# Patient Record
Sex: FEMALE | Race: WHITE | Employment: OTHER | URBAN - METROPOLITAN AREA
[De-identification: names, ages, dates, MRNs, and addresses within clinical notes are randomized per-mention and may not be internally consistent; named-entity substitution may affect disease eponyms.]

---

## 2017-03-07 ENCOUNTER — HOSPITAL ENCOUNTER (OUTPATIENT)
Dept: MAMMOGRAPHY | Age: 82
Discharge: HOME | End: 2017-03-07
Attending: INTERNAL MEDICINE | Admitting: INTERNAL MEDICINE

## 2017-03-07 DIAGNOSIS — Z12.31 VISIT FOR SCREENING MAMMOGRAM: ICD-10-CM

## 2018-03-20 ENCOUNTER — HOSPITAL ENCOUNTER (OUTPATIENT)
Dept: MAMMOGRAPHY | Age: 83
Discharge: HOME | End: 2018-03-20
Attending: INTERNAL MEDICINE | Admitting: INTERNAL MEDICINE

## 2018-03-20 DIAGNOSIS — Z12.31 VISIT FOR SCREENING MAMMOGRAM: ICD-10-CM

## 2019-03-14 ENCOUNTER — HOSPITAL ENCOUNTER (OUTPATIENT)
Dept: MAMMOGRAPHY | Age: 84
Discharge: HOME | End: 2019-03-14
Payer: MEDICARE

## 2019-03-14 DIAGNOSIS — Z12.31 VISIT FOR SCREENING MAMMOGRAM: ICD-10-CM

## 2019-03-14 PROCEDURE — 77063 BREAST TOMOSYNTHESIS BI: CPT

## 2020-06-03 ENCOUNTER — HOSPITAL ENCOUNTER (OUTPATIENT)
Dept: MAMMOGRAPHY | Age: 85
Discharge: HOME | End: 2020-06-03
Payer: MEDICARE

## 2020-06-03 PROCEDURE — 77067 SCR MAMMO BI INCL CAD: CPT

## 2023-07-26 NOTE — PROGRESS NOTES
RM:________    Referral Provider: No ref. provider found No primary care provider on file.    NEW PATIENT/ CONSULT  PREVIOUS CARDIOLOGIST:   CARDIAC TESTING:     : 1935   AGE: 88 y.o.    2023  REASON FOR VISIT/  CC:      WT: ____________ BP: __________L __________R/ HR_______________    CHEST PAIN: _____________    SOA: ____________PALPS: __________      LIGHTHEADED: ___________ FATIGUE: _______________ EDEMA______________  ALLERGIES:  Patient has no allergy information on record.  SMOKING HISTORY          CHILDREN: _______________________       CAFFEINE USE________  ALCOHOL _____________  OCCUPATION_____________  No past surgical history on file.             FAMILY HISTORY  HEART DISEASE  CHF  DIABETES  CARDIAC ARREST  STROKE  CANCER  ANEURYSM                                                             H/O: MI_____   STROKE________   GOUT_____   ANEMIA______     CAROTID________ HIV____ CAD_______ HYPERCHOL _____    H/O: CHF _____   RF____ DM___ HTN_______PVD____THYROID DISEASE_______    PMH: GI ____   HEPATITIS ___ KIDNEY DISEASE ___ LUNG DISEASE _______     SLEEP APNEA ____ BLOOD CLOTS ____ DVT ____ VEIN STRIPPING ___________     CANCER _________________________________ CHEMO/ RADIATION__________

## 2023-07-27 ENCOUNTER — OFFICE VISIT (OUTPATIENT)
Dept: CARDIOLOGY | Facility: CLINIC | Age: 88
End: 2023-07-27
Payer: MEDICARE

## 2023-07-27 VITALS
HEART RATE: 64 BPM | HEIGHT: 60 IN | SYSTOLIC BLOOD PRESSURE: 103 MMHG | WEIGHT: 134.8 LBS | BODY MASS INDEX: 26.46 KG/M2 | DIASTOLIC BLOOD PRESSURE: 70 MMHG

## 2023-07-27 DIAGNOSIS — I10 PRIMARY HYPERTENSION: ICD-10-CM

## 2023-07-27 DIAGNOSIS — E87.1 CHRONIC HYPONATREMIA: ICD-10-CM

## 2023-07-27 DIAGNOSIS — I49.5 TACHYCARDIA-BRADYCARDIA SYNDROME: ICD-10-CM

## 2023-07-27 DIAGNOSIS — E78.2 MIXED HYPERLIPIDEMIA: ICD-10-CM

## 2023-07-27 DIAGNOSIS — Z95.0 S/P PLACEMENT OF CARDIAC PACEMAKER: ICD-10-CM

## 2023-07-27 DIAGNOSIS — I48.0 PAROXYSMAL ATRIAL FIBRILLATION: ICD-10-CM

## 2023-07-27 DIAGNOSIS — I83.813 VARICOSE VEINS OF BOTH LOWER EXTREMITIES WITH PAIN: ICD-10-CM

## 2023-07-27 DIAGNOSIS — I25.10 NONOCCLUSIVE CORONARY ATHEROSCLEROSIS OF NATIVE CORONARY ARTERY: Primary | ICD-10-CM

## 2023-07-27 PROBLEM — E78.5 HYPERLIPIDEMIA: Status: ACTIVE | Noted: 2023-07-27

## 2023-07-27 PROCEDURE — 1160F RVW MEDS BY RX/DR IN RCRD: CPT | Performed by: INTERNAL MEDICINE

## 2023-07-27 PROCEDURE — 93000 ELECTROCARDIOGRAM COMPLETE: CPT | Performed by: INTERNAL MEDICINE

## 2023-07-27 PROCEDURE — 99204 OFFICE O/P NEW MOD 45 MIN: CPT | Performed by: INTERNAL MEDICINE

## 2023-07-27 PROCEDURE — 1159F MED LIST DOCD IN RCRD: CPT | Performed by: INTERNAL MEDICINE

## 2023-07-27 RX ORDER — ATORVASTATIN CALCIUM 20 MG/1
TABLET, FILM COATED ORAL DAILY
COMMUNITY
Start: 2023-06-18

## 2023-07-27 RX ORDER — APIXABAN 5 MG/1
TABLET, FILM COATED ORAL EVERY 12 HOURS SCHEDULED
COMMUNITY
Start: 2023-06-28

## 2023-07-27 RX ORDER — ASPIRIN 81 MG/1
81 TABLET, CHEWABLE ORAL DAILY
COMMUNITY

## 2023-07-27 RX ORDER — ANTACID TABLETS 500 MG/1
1 TABLET, CHEWABLE ORAL DAILY
COMMUNITY

## 2023-07-27 RX ORDER — FUROSEMIDE 20 MG/1
1 TABLET ORAL 3 TIMES WEEKLY
COMMUNITY
Start: 2023-04-24

## 2023-07-27 RX ORDER — LOSARTAN POTASSIUM 25 MG/1
1 TABLET ORAL DAILY
COMMUNITY
Start: 2023-03-29

## 2023-07-27 RX ORDER — SODIUM CHLORIDE 1 G/1
1 TABLET ORAL DAILY
COMMUNITY
Start: 2023-04-24

## 2023-07-27 RX ORDER — NITROGLYCERIN 400 UG/1
1 SPRAY ORAL
COMMUNITY

## 2023-07-27 RX ORDER — TRAMADOL HYDROCHLORIDE 50 MG/1
50 TABLET ORAL
COMMUNITY
Start: 2023-02-16

## 2023-07-27 NOTE — PROGRESS NOTES
Date of Office Visit: 23  Encounter Provider: Anderson Cordova MD  Place of Service: [facility] CARDIOLOGY  Patient Name: Brina Navarrete  :1935    Chief Complaint   Patient presents with    Establish Care   :     HPI:     Ms. Navarrete is 88 y.o. and presents today to establish care.    She was diagnosed with atrial flutter in  and underwent successful ablation. In 2015, she was diagnosed with PAF and tachy-garry syndrome; she underwent implantation of a dual chamber MDT pacemaker.    In , she underwent coronary angiography, which revealed a 60% lesion in the mid-distal LAD, for which medical therapy was recommended.    She moved here 3 years ago.  She has been following with her cardiologist and her electrophysiologist and no other [place].  She would like to continue with her electrophysiologist until her pacemaker battery is exchanged next year, and then she would like to transfer her pacemaker care to us as well.    She denies chest pain, dyspnea, lightheadedness, or syncope.  She has an occasional palpitation at night when she lies down which feels like a heartbeat.  She does have leg aching and intermittent swelling.  She notes progressively worsening varicose veins.  She tried wearing a varicose veins and elevating them, but it has not helped.    She has chronic hyponatremia and has been placed on salt tablets as well as furosemide.  She follows with Dr. Zavaleta with nephrology.    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     Varicose veins of both lower extremities        Past Surgical History:   Procedure Laterality Date    ABLATION OF DYSRHYTHMIC  FOCUS  8/24/2015    Procedure to correct abnormal heart rhythm    CARDIAC CATHETERIZATION  05/21/14    INSERT / REPLACE / REMOVE PACEMAKER  4/20/2015    Insertion of permanent pacemaker upper and lower chamber    PACEMAKER IMPLANTATION      [employer]       Social History     Socioeconomic History    Marital status:     Number of children: 2   Tobacco Use    Smoking status: Former     Packs/day: 0.50     Years: 25.00     Pack years: 12.50     Types: Cigarettes    Smokeless tobacco: Never    Tobacco comments:     Social smoker only   Vaping Use    Vaping Use: Never used   Substance and Sexual Activity    Alcohol use: Yes     Comment: 2 or 3 glasses of wine weekly    Drug use: Never    Sexual activity: Not Currently     Partners: Male     Birth control/protection: Pill, Diaphragm     Comment: Marriage only       Family History   Problem Relation Age of Onset    Arrhythmia Father         Age  78, a heart attack , recovered    Heart attack Father     Arrhythmia Brother         Ar rhythmic in 50’s,  stroke at  73       Review of Systems   Cardiovascular:         Leg pain     All other systems reviewed and are negative.    Allergies   Allergen Reactions    Ace Inhibitors Cough and Other (See Comments)     Causes cough         Current Outpatient Medications:     ALBUTEROL SULFATE HFA IN, Inhale 2 puffs As Needed., Disp: , Rfl:     aspirin 81 MG chewable tablet, Chew 1 tablet Daily., Disp: , Rfl:     atorvastatin (LIPITOR) 20 MG tablet, Daily., Disp: , Rfl:     Calcium Carbonate 500 MG chewable tablet, Chew 1 tablet Daily., Disp: , Rfl:     Cholecalciferol 25 MCG (1000 UT) capsule, Take 1 capsule by mouth Daily., Disp: , Rfl:     Eliquis 5 MG tablet tablet, Every 12 (Twelve) Hours., Disp: , Rfl:     furosemide (LASIX) 20 MG tablet, Take 1 tablet by mouth 3 (Three) Times a Week., Disp: , Rfl:     losartan (COZAAR) 25 MG tablet, Take 1 tablet by mouth Daily., Disp: , Rfl:     metoprolol tartrate (LOPRESSOR) 25 MG  "tablet, Take 1 tablet by mouth., Disp: , Rfl:     nitroglycerin (NITROLINGUAL) 0.4 MG/SPRAY spray, Place 1 spray under the tongue Every 5 (Five) Minutes As Needed for Chest Pain., Disp: , Rfl:     sodium chloride 1 g tablet, Take 1 tablet by mouth Daily., Disp: , Rfl:     traMADol (ULTRAM) 50 MG tablet, Take 1 tablet by mouth., Disp: , Rfl:       Objective:     Vitals:    07/27/23 1425 07/27/23 1500 07/27/23 1501   BP: 132/86 103/72 103/70   BP Location: Left arm Right arm Left arm   Pulse: 64     Weight: 61.1 kg (134 lb 12.8 oz)     Height: 151.1 cm (59.5\")       Body mass index is 26.77 kg/m².    Vitals reviewed.   Constitutional:       Appearance: Well-developed and not in distress.   Eyes:      Conjunctiva/sclera: Conjunctivae normal.   HENT:      Head: Normocephalic.      Nose: Nose normal.    Mouth/Throat:      Pharynx: Oropharynx is clear.   Neck:      Thyroid: Thyroid normal.      Vascular: No JVD. JVD normal.      Lymphadenopathy: No cervical adenopathy.   Pulmonary:      Effort: Pulmonary effort is normal.      Breath sounds: Normal breath sounds.   Cardiovascular:      Normal rate. Regular rhythm.      Murmurs: There is no murmur.   Pulses:     Intact distal pulses.   Edema:     Peripheral edema absent.   Abdominal:      Palpations: Abdomen is soft.      Tenderness: There is no abdominal tenderness.   Musculoskeletal: Normal range of motion.      Cervical back: Normal range of motion. Skin:     General: Skin is warm and dry.      Findings: No rash.   Neurological:      General: No focal deficit present.      Mental Status: Alert and oriented to person, place, and time.      Cranial Nerves: No cranial nerve deficit.   Psychiatric:         Behavior: Behavior normal.         Thought Content: Thought content normal.         Judgment: Judgment normal.         ECG 12 Lead    Date/Time: 7/27/2023 2:42 PM  Performed by: Anderson Cordova MD  Authorized by: Anderson Cordova MD   Comparison: compared with previous ECG "   Similar to previous ECG  Rhythm: paced  Rhythm comments: AP VS  Conduction: conduction normal  ST Segments: ST segments normal  T Waves: T waves normal  QRS axis: left  Other findings: low voltage and poor R wave progression    Clinical impression: non-specific ECG          Assessment:       Diagnosis Plan   1. Nonocclusive coronary atherosclerosis of native coronary artery  ECG 12 Lead      2. Mixed hyperlipidemia        3. Paroxysmal atrial fibrillation        4. Tachycardia-bradycardia syndrome        5. S/P placement of cardiac pacemaker        6. Primary hypertension        7. Chronic hyponatremia        8. Varicose veins of both lower extremities with pain               Plan:       1/2.  She had moderate nonobstructive coronary disease in the mid to distal LAD in 2014.  She has no angina.  Given her advanced age, I do not see that the benefit of low-dose aspirin when she is already fully anticoagulated with apixaban.  I have recommended stopping aspirin, but I think it is reasonable for her to remain on low-dose atorvastatin.    3/4/5.  I actually suspect that her short-lived palpitations at nighttime are PVCs, and not atrial fibrillation.  She will remain on apixaban and metoprolol.  She is going to continue to follow with her electrophysiologist in [facility] until the pacemaker battery is exchanged next year.  She would then like to transfer her pacemaker care to us.  That is completely fine with me.    6.  Her first blood pressure appears serious.  I rechecked it in both arms and got 103/70 mmHg.  She has intermittently had markedly low blood pressures, with systolic values as low as 82 mmHg.  She is very symptomatic when that happens.  I have stopped losartan.    7.  She is having trouble with the salt tablets and the furosemide as the latter causes polyuria and the former make her nauseated.  I do not feel that her hyponatremia is due to congestive heart failure.    8.  She inquires as to  whether or not she should see a vascular surgeon.  I think it is reasonable for her to be evaluated at the vein center.  She is going to call to see if she can make an appointment.    Sincerely,       Anderson Cordova MD

## 2023-07-27 NOTE — PROGRESS NOTES
Pt would like to keep her EP physician (Dr Jhaveri, [facility]) for a 1 yr, due to the upcoming battery change that is tentatively scheduled for 2024.

## 2024-01-29 ENCOUNTER — OFFICE VISIT (OUTPATIENT)
Dept: CARDIOLOGY | Facility: CLINIC | Age: 89
End: 2024-01-29
Payer: MEDICARE

## 2024-01-29 VITALS
SYSTOLIC BLOOD PRESSURE: 160 MMHG | WEIGHT: 136.8 LBS | HEART RATE: 64 BPM | OXYGEN SATURATION: 99 % | DIASTOLIC BLOOD PRESSURE: 98 MMHG | BODY MASS INDEX: 27.58 KG/M2 | HEIGHT: 59 IN

## 2024-01-29 DIAGNOSIS — Z86.79 HISTORY OF ATRIAL FLUTTER: ICD-10-CM

## 2024-01-29 DIAGNOSIS — E78.2 MIXED HYPERLIPIDEMIA: ICD-10-CM

## 2024-01-29 DIAGNOSIS — I25.10 NONOCCLUSIVE CORONARY ATHEROSCLEROSIS OF NATIVE CORONARY ARTERY: ICD-10-CM

## 2024-01-29 DIAGNOSIS — E87.1 CHRONIC HYPONATREMIA: ICD-10-CM

## 2024-01-29 DIAGNOSIS — I48.0 PAROXYSMAL ATRIAL FIBRILLATION: ICD-10-CM

## 2024-01-29 DIAGNOSIS — I49.5 TACHYCARDIA-BRADYCARDIA SYNDROME: Primary | ICD-10-CM

## 2024-01-29 DIAGNOSIS — I10 PRIMARY HYPERTENSION: ICD-10-CM

## 2024-01-29 DIAGNOSIS — Z95.0 S/P PLACEMENT OF CARDIAC PACEMAKER: ICD-10-CM

## 2024-01-29 PROCEDURE — 93000 ELECTROCARDIOGRAM COMPLETE: CPT | Performed by: NURSE PRACTITIONER

## 2024-01-29 PROCEDURE — 1160F RVW MEDS BY RX/DR IN RCRD: CPT | Performed by: NURSE PRACTITIONER

## 2024-01-29 PROCEDURE — 99214 OFFICE O/P EST MOD 30 MIN: CPT | Performed by: NURSE PRACTITIONER

## 2024-01-29 PROCEDURE — 1159F MED LIST DOCD IN RCRD: CPT | Performed by: NURSE PRACTITIONER

## 2024-01-29 NOTE — PROGRESS NOTES
Date of Office Visit: 2024  Encounter Provider: KELVIN Chicas  Place of Service: [facility] CARDIOLOGY  Patient Name: Brina Navarrete  :1935  Primary Cardiologist: Dr. Anderson Cordova     Chief Complaint   Patient presents with    Follow-up   :     HPI: Brina Navarrete is a 88 y.o. female who presents today for 6-month cardiac follow-up visit.  She is a new patient to me and I reviewed her medical records.    She has previously seen cardiologist in [facility].  In , she underwent coronary angiography which revealed a 60% lesion in the mid-distal LAD and medical treatment was recommended.    In , she was diagnosed with atrial flutter and underwent successful ablation.  In 2015, she was diagnosed with paroxysmal atrial fibrillation and tachybradycardia syndrome.  She underwent implantation of dual-chamber MDT pacemaker.    In 2023, she established care with Dr. Anderson Cordova.  She reported that she has chronic hyponatremia in which she takes sodium tablets and this is followed by Dr. Zavaleta.  She also has varicose veins.  She said she will be due for a pacemaker generator change in  and wants to continue with her current electrophysiologist in [facility] until then.  When the generator is changed, she will then have her pacemaker transmissions followed here.    She presents today for follow-up visit.  Her blood pressure was elevated on both checks.  She says typically her blood pressure runs systolic 120s.  Occasionally she experiences palpitations at nighttime, but nothing recent.  She denies chest pain, shortness of air, edema, dizziness, syncope, or bleeding.      Past Medical History:   Diagnosis Date    Atrial flutter     s/p ablation     Hyperlipidemia     Hypertension 2015    Hyponatremia     Nonocclusive coronary atherosclerosis of native coronary artery     2015: 60% mid-distal LAD, treated medically    Paroxysmal atrial  fibrillation     Tachycardia-bradycardia syndrome     s/p MDT dual chamber PPM 2015    Varicose veins of both lower extremities        Past Surgical History:   Procedure Laterality Date    ABLATION OF DYSRHYTHMIC FOCUS  8/24/2015    Procedure to correct abnormal heart rhythm    CARDIAC CATHETERIZATION  05/21/14    INSERT / REPLACE / REMOVE PACEMAKER  4/20/2015    Insertion of permanent pacemaker upper and lower chamber    PACEMAKER IMPLANTATION      [employer]       Social History     Socioeconomic History    Marital status:     Number of children: 2   Tobacco Use    Smoking status: Former     Packs/day: 0.50     Years: 25.00     Additional pack years: 0.00     Total pack years: 12.50     Types: Cigarettes    Smokeless tobacco: Never    Tobacco comments:     Social smoker only   Vaping Use    Vaping Use: Never used   Substance and Sexual Activity    Alcohol use: Yes     Comment: 2 or 3 glasses of wine weekly    Drug use: Never    Sexual activity: Not Currently     Partners: Male     Birth control/protection: Pill, Diaphragm     Comment: Marriage only       Family History   Problem Relation Age of Onset    Arrhythmia Father         Age  78, a heart attack , recovered    Heart attack Father     Arrhythmia Brother         Ar rhythmic in 50’s,  stroke at  73       The following portion of the patient's history were reviewed and updated as appropriate: past medical history, past surgical history, past social history, past family history, allergies, current medications, and problem list.    Review of Systems   Constitutional: Negative.   Cardiovascular: Negative.    Respiratory: Negative.     Hematologic/Lymphatic: Negative.    Neurological: Negative.        Allergies   Allergen Reactions    Ace Inhibitors Cough and Other (See Comments)     Causes cough         Current Outpatient Medications:     ALBUTEROL SULFATE HFA IN, Inhale 2 puffs As Needed., Disp: , Rfl:     atorvastatin (LIPITOR) 20 MG tablet, Daily., Disp:  ", Rfl:     Calcium Carbonate 500 MG chewable tablet, Chew 1 tablet Daily., Disp: , Rfl:     Cholecalciferol 25 MCG (1000 UT) capsule, Take 1 capsule by mouth Daily., Disp: , Rfl:     Eliquis 5 MG tablet tablet, Every 12 (Twelve) Hours., Disp: , Rfl:     metoprolol tartrate (LOPRESSOR) 25 MG tablet, Take 1 tablet by mouth 2 (Two) Times a Day., Disp: , Rfl:     nitroglycerin (NITROLINGUAL) 0.4 MG/SPRAY spray, Place 1 spray under the tongue Every 5 (Five) Minutes As Needed for Chest Pain., Disp: , Rfl:     sodium chloride 1 g tablet, Take 1 tablet by mouth Daily., Disp: , Rfl:          Objective:     Vitals:    01/29/24 1121 01/29/24 1129 01/29/24 1146   BP: 140/90 142/90 160/98   BP Location: Right arm Left arm Left arm   Patient Position: Sitting Sitting Sitting   Cuff Size: Adult Adult    Pulse: 64     SpO2: 99%     Weight: 62.1 kg (136 lb 12.8 oz)     Height: 151.1 cm (59.49\")       Body mass index is 27.18 kg/m².    PHYSICAL EXAM:    Vitals Reviewed.   General Appearance: No acute distress, well developed and well nourished.   HENT: No hearing loss noted.    Respiratory: No signs of respiratory distress. Respiration rhythm and depth normal.  Clear to auscultation.   Cardiovascular:  Jugular Venous Pressure: Normal  Heart Rate and Rhythm: Normal, Heart Sounds: Normal S1 and S2. No S3 or S4 noted.  Murmurs: No murmurs noted. No rubs, thrills, or gallops.   Lower Extremities: No edema noted.  Musculoskeletal: Normal movement of extremities.  Skin: General appearance normal.    Psychiatric: Patient alert and oriented to person, place, and time. Speech and behavior appropriate. Normal mood and affect.       ECG 12 Lead    Date/Time: 1/29/2024 11:23 AM  Performed by: Elisabeth Fischer APRN    Authorized by: Elisabeth Fischer APRN  Comparison: compared with previous ECG from 7/27/2023  Similar to previous ECG  Rhythm: sinus rhythm and paced  Rate: normal  BPM: 64  Conduction: conduction normal  ST Segments: ST segments " normal  T Waves: T waves normal  QRS axis: normal  Pacing: atrial sensed rhythm and 100% capture  Clinical impression: abnormal EKG            Assessment:       Diagnosis Plan   1. Tachycardia-bradycardia syndrome        2. S/P placement of cardiac pacemaker        3. History of atrial flutter        4. Paroxysmal atrial fibrillation        5. Nonocclusive coronary atherosclerosis of native coronary artery        6. Primary hypertension        7. Mixed hyperlipidemia        8. Chronic hyponatremia               Plan:       1/2.  History of tacky-bradycardia syndrome: Status postcardiac pacemaker.  She still follows with her electrophysiologist in [facility] and will continue to do so until he changes out the generator approximately in 2025.  After that, she said she will have her pacemaker transmissions followed here in Dr. Cordova is okay with this.    3.  History of atrial flutter: Status post ablation in 2015.    4.  Paroxysmal Atrial Fibrillation: Also diagnosed in 2015.  She remains on metoprolol tartrate and apixaban for stroke prevention. [id number] score of 5.    5.  Nonocclusive coronary atherosclerosis diagnosed per coronary angiography in 2014.  She is no longer on aspirin because of the apixaban.  Continue atorvastatin.    6.  Hypertension: Blood pressure quite elevated today in the office.  She says typically her systolic blood pressures in the 120s.  She is no longer on the losartan.  I asked her to continue to monitor and call our office or PCP office if it remains elevated.    7.  Hyperlipidemia: Remains on atorvastatin.    8.  Chronic hyponatremia: Remains on a sodium tablet followed by nephrology.    9.  I recommended a 1 year follow-up visit with Dr. Anderson Cordova.    As always, it has been a pleasure to participate in your patient's care. Thank you.         Sincerely,         KELVIN Kemp  [facility] Cardiology      Dictated utilizing Dragon Dictation

## 2024-07-09 ENCOUNTER — TELEPHONE (OUTPATIENT)
Dept: CARDIOLOGY | Facility: CLINIC | Age: 89
End: 2024-07-09

## 2024-07-09 NOTE — TELEPHONE ENCOUNTER
Called and spoke with pt. She stated that she has had jaw pain while walking for the past couple of weeks. She stated it is more of a discomfort. She did have arm pain but it was her right arm. She does feel more fatigue, but she denied any other symptoms. She is also feeling more anxious. Please advise. She was seen back in January of this year with Elisabeth.     She is currently taking:    Eliquis 5 mg BID  Metoprolol 25 mg BID

## 2024-07-09 NOTE — TELEPHONE ENCOUNTER
"Any symptoms at rest? The message says \"sometimes with walking.\" Is that correct? How much activity would it take to elicit symptoms? Any SOA, LH, sweating?   "

## 2024-07-09 NOTE — TELEPHONE ENCOUNTER
"Patient states that there have been 2 occasions when she is lying in bed that she has the discomfort. She said she does notice it more with exertion. She said she would have to walk a half a block before the symptoms occur. She denies any SOA or lightheadedness. She does have some intermittent \"hot flashes\" during the day, but it is not associated with the discomfort. She said she also has a dry cough sometimes at night or in the early morning hours.     Caroline Pereira RN  Triage [facility]   "

## 2024-07-09 NOTE — TELEPHONE ENCOUNTER
Caller: Brina Navarrete    Relationship to patient: Self    Best call back number: [phone] (home)      Chief complaint: SOMETIMES WHEN WALKING PT NOTICES HER JAW BOTHERING HER    Type of visit: FU     Requested date: ASAP     Additional notes:PT WAS REFERRED BY HER EP DOCTOR, DR DEMETRICE MENDOZA, AT [facility] TO FU WITH HER CARDIOLOGIST ASAP DUE TO SYMPTOMS SHE'S BEEN EXPERIENCING.

## 2024-07-11 ENCOUNTER — OFFICE VISIT (OUTPATIENT)
Dept: CARDIOLOGY | Facility: CLINIC | Age: 89
End: 2024-07-11
Payer: MEDICARE

## 2024-07-11 VITALS
BODY MASS INDEX: 27.82 KG/M2 | HEART RATE: 62 BPM | DIASTOLIC BLOOD PRESSURE: 78 MMHG | HEIGHT: 59 IN | OXYGEN SATURATION: 98 % | SYSTOLIC BLOOD PRESSURE: 126 MMHG | WEIGHT: 138 LBS

## 2024-07-11 DIAGNOSIS — I25.10 NONOCCLUSIVE CORONARY ATHEROSCLEROSIS OF NATIVE CORONARY ARTERY: ICD-10-CM

## 2024-07-11 DIAGNOSIS — E87.1 CHRONIC HYPONATREMIA: ICD-10-CM

## 2024-07-11 DIAGNOSIS — I48.0 PAROXYSMAL ATRIAL FIBRILLATION: ICD-10-CM

## 2024-07-11 DIAGNOSIS — I49.5 TACHYCARDIA-BRADYCARDIA SYNDROME: ICD-10-CM

## 2024-07-11 DIAGNOSIS — I10 PRIMARY HYPERTENSION: ICD-10-CM

## 2024-07-11 DIAGNOSIS — Z86.79 HISTORY OF ATRIAL FLUTTER: ICD-10-CM

## 2024-07-11 DIAGNOSIS — R68.84 JAW PAIN: Primary | ICD-10-CM

## 2024-07-11 DIAGNOSIS — Z95.0 S/P PLACEMENT OF CARDIAC PACEMAKER: ICD-10-CM

## 2024-07-11 DIAGNOSIS — E78.2 MIXED HYPERLIPIDEMIA: ICD-10-CM

## 2024-07-11 PROCEDURE — 1159F MED LIST DOCD IN RCRD: CPT | Performed by: NURSE PRACTITIONER

## 2024-07-11 PROCEDURE — 93000 ELECTROCARDIOGRAM COMPLETE: CPT | Performed by: NURSE PRACTITIONER

## 2024-07-11 PROCEDURE — 99214 OFFICE O/P EST MOD 30 MIN: CPT | Performed by: NURSE PRACTITIONER

## 2024-07-11 PROCEDURE — 1160F RVW MEDS BY RX/DR IN RCRD: CPT | Performed by: NURSE PRACTITIONER

## 2024-07-11 RX ORDER — METOPROLOL TARTRATE 50 MG/1
50 TABLET, FILM COATED ORAL 2 TIMES DAILY
COMMUNITY

## 2024-07-11 RX ORDER — MULTIPLE VITAMINS W/ MINERALS TAB 9MG-400MCG
1 TAB ORAL DAILY
COMMUNITY

## 2024-07-11 RX ORDER — BIFIDOBACTERIUM LONGUM SUBSP. INFANTIS, AVOBENZONE, HOMOSALATE, OCTISALATE, OCTOCRYLENE, AND OXYBENZONE
KIT
COMMUNITY

## 2024-07-11 RX ORDER — ACETAMINOPHEN 500 MG
500 TABLET ORAL EVERY 6 HOURS PRN
COMMUNITY

## 2024-07-11 NOTE — PROGRESS NOTES
Date of Office Visit: 2024  Encounter Provider: KELVIN Chicas  Place of Service: [facility] CARDIOLOGY  Patient Name: Brina Navarrete  :1935  Primary Cardiologist: Dr. Anderson Cordova    Chief Complaint   Patient presents with    Jaw Pain   :     HPI: Brina Navarrete is a 89 y.o. female who presents today for evaluation of jaw pain.  I have reviewed her medical records.    She has been diagnosed with hypertension, hyperlipidemia, varicosities of lower extremities, and chronic hyponatremia followed by Dr. Zavaleta.     [place] has [place] [facility]  In , she underwent coronary angiography which revealed a 60% lesion in the mid-distal LAD and medical treatment was recommended.     In , she was diagnosed with atrial flutter and underwent successful ablation.  In 2015, she was diagnosed with paroxysmal atrial fibrillation and tachybradycardia syndrome.  She underwent implantation of dual-chamber MDT pacemaker.  She is expected to have a generator change in  and wants to continue with her current electrophysiologist [place] [facility] then.  Once her generator is changed, she wants to transfer her pacemaker care to our office.    She presents today with her daughter accompanying her.  She says she has had intermittent jaw discomfort over the years.  She previously noticed it when walking in cold weather.  Over the past month, she has had 4 episodes of jaw discomfort. Two times she had jaw discomfort when walking a half of a block and it resolved within minutes.  The other 2 times, she had jaw discomfort when lying in bed.  She denies any other associated symptoms except 1 time she had a headache.  She denies chest pain, shortness of air, palpitations, edema, dizziness, syncope, or bleeding.  She reports fatigue.    She also recently had a pacemaker download at her cardiology [place] [facility]  She was told that she  had 15 hours of atrial fibrillation.  Her cardiologist they are increased her metoprolol to 50 mg twice per day and she started this new dosage yesterday.      Past Medical History:   Diagnosis Date    Atrial flutter     s/p ablation 2015    Hyperlipidemia     Hypertension 4/2015    Hyponatremia     Nonocclusive coronary atherosclerosis of native coronary artery     2015: 60% mid-distal LAD, treated medically    Paroxysmal atrial fibrillation     Tachycardia-bradycardia syndrome     s/p MDT dual chamber PPM 2015    Varicose veins of both lower extremities        Past Surgical History:   Procedure Laterality Date    ABLATION OF DYSRHYTHMIC FOCUS  8/24/2015    Procedure to correct abnormal heart rhythm    CARDIAC CATHETERIZATION  05/21/14    INSERT / REPLACE / REMOVE PACEMAKER  4/20/2015    Insertion of permanent pacemaker upper and lower chamber    PACEMAKER IMPLANTATION      [employer]       Social History     Socioeconomic History    Marital status:     Number of children: 2   Tobacco Use    Smoking status: Former     Current packs/day: 0.50     Average packs/day: 0.5 packs/day for 25.0 years (12.5 ttl pk-yrs)     Types: Cigarettes    Smokeless tobacco: Never    Tobacco comments:     Social smoker only   Vaping Use    Vaping status: Never Used   Substance and Sexual Activity    Alcohol use: Yes     Comment: 2 or 3 glasses of wine weekly    Drug use: Never    Sexual activity: Not Currently     Partners: Male     Birth control/protection: Pill, Diaphragm     Comment: Marriage only       Family History   Problem Relation Age of Onset    Arrhythmia Father         Age  78, a heart attack , recovered    Heart attack Father     Arrhythmia Brother         Ar rhythmic in 50’s,  stroke at  73       The following portion of the patient's history were reviewed and updated as appropriate: past medical history, past surgical history, past social history, past family history, allergies, current medications, and problem  "list.    Review of Systems   Constitutional: Positive for malaise/fatigue.   Cardiovascular: Negative.         Jaw pain   Respiratory: Negative.     Hematologic/Lymphatic: Negative.    Neurological: Negative.        Allergies   Allergen Reactions    Ace Inhibitors Cough and Other (See Comments)     Causes cough         Current Outpatient Medications:     acetaminophen (TYLENOL) 500 MG tablet, Take 1 tablet by mouth Every 6 (Six) Hours As Needed for Mild Pain., Disp: , Rfl:     ALBUTEROL SULFATE HFA IN, Inhale 2 puffs As Needed., Disp: , Rfl:     atorvastatin (LIPITOR) 20 MG tablet, Daily., Disp: , Rfl:     Bacillus Coagulans-Inulin (Align Prebiotic-Probiotic) 5-1.25 MG-GM chewable tablet, Chew., Disp: , Rfl:     Eliquis 5 MG tablet tablet, Every 12 (Twelve) Hours., Disp: , Rfl:     multivitamin with minerals (Centrum Silver 50+Women) tablet tablet, Take 1 tablet by mouth Daily., Disp: , Rfl:     nitroglycerin (NITROLINGUAL) 0.4 MG/SPRAY spray, Place 1 spray under the tongue Every 5 (Five) Minutes As Needed for Chest Pain., Disp: , Rfl:     Pumpkin Seed-Soy Germ (AZO BLADDER CONTROL/GO-LESS PO), Take  by mouth., Disp: , Rfl:     sodium chloride 1 g tablet, Take 1 tablet by mouth Daily., Disp: , Rfl:          Objective:     Vitals:    07/11/24 0928   BP: 126/78   Pulse: 62   SpO2: 98%   Weight: 62.6 kg (138 lb)   Height: 151.1 cm (59.49\")     Body mass index is 27.42 kg/m².    PHYSICAL EXAM:    Vitals Reviewed.   General Appearance: No acute distress, well developed and well nourished.   HENT: No hearing loss noted.    Respiratory: No signs of respiratory distress. Respiration rhythm and depth normal.  Clear to auscultation.   Cardiovascular:  Jugular Venous Pressure: Normal  Heart Rate and Rhythm: Normal, Heart Sounds: Normal S1 and S2. No S3 or S4 noted.  Murmurs: No murmurs noted. No rubs, thrills, or gallops.   Lower Extremities: No edema noted.  Musculoskeletal: Normal movement of extremities.  Skin: General " appearance normal.    Psychiatric: Patient alert and oriented to person, place, and time. Speech and behavior appropriate. Normal mood and affect.       ECG 12 Lead    Date/Time: 7/11/2024 9:34 AM  Performed by: Elisabeth Fischer APRN    Authorized by: Elisabeth Fischer APRN  Comparison: compared with previous ECG from 1/29/2024  Similar to previous ECG  Rhythm: sinus rhythm and paced  Rate: normal  BPM: 62  Conduction: conduction normal  ST Segments: ST segments normal  T Waves: T waves normal  QRS axis: normal  Pacing: atrial sensed rhythm and 100% capture  Clinical impression: abnormal EKG            Assessment:       Diagnosis Plan   1. Jaw pain        2. Nonocclusive coronary atherosclerosis of native coronary artery        3. Tachycardia-bradycardia syndrome        4. S/P placement of cardiac pacemaker        5. History of atrial flutter        6. Paroxysmal atrial fibrillation        7. Primary hypertension        8. Mixed hyperlipidemia        9. Chronic hyponatremia               Plan:       1.  Jaw pain: She reports intermittent jaw discomfort over the past years.  She has had 4 episodes in the past month. Two episodes occurred with exertion and the other 2 episodes occurred while lying in bed.  She reports that she has been under an increased amount of stress over the past month.  Yesterday, her electrophysiologist in [facility] increased her metoprolol to 50 mg twice per day.  Using joint decision-making, we decided we will wait a couple weeks to see if the metoprolol helps to resolve the jaw discomfort.  If not, we will most likely proceed with stress testing.    2. Nonocclusive coronary atherosclerosis of the LAD diagnosed per coronary angiography in 2014. She is no longer on aspirin because of the apixaban. Continue atorvastatin.  Strong discomfort may be an anginal equivalent.    3/4.  History of tacky-bradycardia syndrome: Status postcardiac pacemaker. She still follows with her  electrophysiologist in [facility] and will continue to do so until he changes out the generator approximately in 2025. After that, she said she will have her pacemaker transmissions followed here in Dr. Cordova is okay with this.     5. History of atrial flutter: Status post ablation in 2015.     6. Paroxysmal Atrial Fibrillation: Also diagnosed in 2015.  She remains on metoprolol tartrate and apixaban for stroke prevention. [id number] score of 5.  She was just told by her EP doctor that she had 15 hours of atrial fibrillation on last pacemaker download.  He increased her metoprolol to 50 mg twice per day.    7.  Hypertension: Blood pressure well-controlled.    8.  Hyperlipidemia: Remains on atorvastatin.    9.  Chronic hyponatremia: Remains on a sodium tablet followed by nephrology.     10.  I will follow-up with her via phone in the first week of August to reassess her jaw discomfort and how she has responded to the metoprolol.  If she has any worsening or more frequent symptoms before then, she will call the office.  In the event that she has an acute episode that is not resolving then she will present to the ED.    As always, it has been a pleasure to participate in your patient's care. Thank you.         Sincerely,         KELVIN Kemp  [facility] Cardiology      Dictated utilizing Dragon Dictation  I spent 30 minutes reviewing her medical records/testing/previous office notes/labs, face-to-face interaction with patient, physical examination, formulating the plan of care, and discussion of plan of care with patient.

## 2024-08-07 ENCOUNTER — TELEPHONE (OUTPATIENT)
Dept: CARDIOLOGY | Facility: CLINIC | Age: 89
End: 2024-08-07
Payer: MEDICARE

## 2024-08-07 NOTE — TELEPHONE ENCOUNTER
Please call patient.  She reported a few episodes of jaw discomfort.  Her metoprolol was increased to 50 mg twice per day.      Can you please call patient and see if she is having jaw discomfort?  Also ask if she is having chest pain, shortness of breath, or palpitations?    Thank you

## 2024-08-07 NOTE — TELEPHONE ENCOUNTER
Spoke to patient. She has had 1 more episode of jaw discomfort since the increase in medication. She denies chest pain, shortness of breath, and palpitations. She did say for the last 2 years she will occasionally have this squeezing sensation over her body. She said it only happens at night when she goes to the restroom and it does not happen every night.     Caroline Pereira RN  [place] [facility] [place]

## 2024-08-09 NOTE — TELEPHONE ENCOUNTER
I called patient.  She says she has had 1 episode of jaw discomfort since increasing the medication.  She feels like she is doing better on the metoprolol.  I offered a stress test and she wants to think about it.  She is going to monitor herself over the next couple of weeks.  If she still having concerns or jaw pain she will contact the office.

## 2024-08-12 ENCOUNTER — TELEPHONE (OUTPATIENT)
Dept: CARDIOLOGY | Facility: CLINIC | Age: 89
End: 2024-08-12
Payer: MEDICARE

## 2024-08-12 DIAGNOSIS — R07.2 PRECORDIAL PAIN: ICD-10-CM

## 2024-08-12 DIAGNOSIS — R68.84 JAW PAIN: ICD-10-CM

## 2024-08-12 DIAGNOSIS — I25.10 NONOCCLUSIVE CORONARY ATHEROSCLEROSIS OF NATIVE CORONARY ARTERY: Primary | ICD-10-CM

## 2024-08-12 NOTE — TELEPHONE ENCOUNTER
Caller: Brina Navarrete    Relationship to patient: Self    Best call back number: [phone]    Chief complaint: SQUEEZING FEELING IN BODY- NO ACTIVE SYMPTOMS NOW    Type of visit: STRESS TEST    Requested date: THIS WEEK OR NEXT WEEK      Additional notes:PT HAD EPISODE SATURDAY NIGHT AND SHE WANTS TO GO THROUGH WITH THE STRESS TEST.

## 2024-08-13 NOTE — TELEPHONE ENCOUNTER
Thank you for the update.  I placed an order for her to have an echocardiogram and stress test to be completed.  Please let her know and ask scheduling to arrange.  Thank you

## 2024-08-13 NOTE — TELEPHONE ENCOUNTER
"Called Brina Navarrete for additional information.    Patient reports jaw pain has subsided.  She stated she went for a brisk walk this morning with no jaw pain.    However, patient reports the \"squeezing feeling\" has continued.  Saturday night, patient reports she awoke to use the restroom and when she laid down, she felt her \"whole body squeezing\", like a \"pressure\".  Patient likened the sensation to \"squeezing water out of a cloth\".  Episodes last less than 5 seconds.  Patient reports sensation is not just in her arms and legs, and that she felt it in her \"chest area\" as well.    Patient stated she spoke with Elisabeth on Friday and that a stress test had been discussed during call:    She stated Elisabeth told her to let her know if she decided to go forward with stress test.  Patient stated she would like to undergo stress test.    Please let me know how you would like to proceed.    Thank you,  Tenisha HOPKINS RN  Triage Nurse [facility]   10:21 EDT    "

## 2024-08-13 NOTE — TELEPHONE ENCOUNTER
Reviewed recommendations with Brina Navarrete and she is agreeable to this plan.  Transferred call to scheduling to arrange echo and stress test.    Thank you,  Tenisha HOPKINS RN  Triage Nurse [facility]   11:20 EDT

## 2024-08-13 NOTE — TELEPHONE ENCOUNTER
Chart review shows testing is scheduled for 8/21/2024.    Thank you,  Tenisha HOPKINS RN  Triage Nurse [facility] [place]   12:37 EDT

## 2024-08-16 NOTE — TELEPHONE ENCOUNTER
PATIENT HAS SOME QUESTIONS ABOUT THE PREP FOR HER STRESS TEST, IF SH E SHOULD QUIT TAKING METOPROLOL 50MG TWICE DAILY?

## 2024-08-20 ENCOUNTER — TELEPHONE (OUTPATIENT)
Dept: CARDIOLOGY | Facility: CLINIC | Age: 89
End: 2024-08-20
Payer: MEDICARE

## 2024-08-21 ENCOUNTER — HOSPITAL ENCOUNTER (OUTPATIENT)
Dept: CARDIOLOGY | Facility: HOSPITAL | Age: 89
Discharge: HOME | End: 2024-08-21
Admitting: NURSE PRACTITIONER
Payer: MEDICARE

## 2024-08-21 ENCOUNTER — HOSPITAL ENCOUNTER (OUTPATIENT)
Dept: CARDIOLOGY | Facility: HOSPITAL | Age: 89
Discharge: HOME | End: 2024-08-21
Payer: MEDICARE

## 2024-08-21 VITALS
WEIGHT: 136.69 LBS | DIASTOLIC BLOOD PRESSURE: 80 MMHG | HEART RATE: 69 BPM | SYSTOLIC BLOOD PRESSURE: 140 MMHG | BODY MASS INDEX: 27.56 KG/M2 | HEIGHT: 59 IN

## 2024-08-21 VITALS — BODY MASS INDEX: 27.56 KG/M2 | WEIGHT: 136.69 LBS | HEIGHT: 59 IN

## 2024-08-21 DIAGNOSIS — R07.2 PRECORDIAL PAIN: ICD-10-CM

## 2024-08-21 DIAGNOSIS — R68.84 JAW PAIN: ICD-10-CM

## 2024-08-21 DIAGNOSIS — I25.10 NONOCCLUSIVE CORONARY ATHEROSCLEROSIS OF NATIVE CORONARY ARTERY: ICD-10-CM

## 2024-08-21 LAB
AORTIC DIMENSIONLESS INDEX: 0.9 (DI)
ASCENDING AORTA: 2.8 CM
BH CV ECHO MEAS - ACS: 1.71 CM
BH CV ECHO MEAS - AO MAX PG: 6.7 MMHG
BH CV ECHO MEAS - AO MEAN PG: 3.4 MMHG
BH CV ECHO MEAS - AO ROOT DIAM: 2.7 CM
BH CV ECHO MEAS - AO V2 MAX: 129 CM/SEC
BH CV ECHO MEAS - AO V2 VTI: 30.6 CM
BH CV ECHO MEAS - AVA(I,D): 2.18 CM2
BH CV ECHO MEAS - EDV(CUBED): 26 ML
BH CV ECHO MEAS - EDV(MOD-SP2): 65 ML
BH CV ECHO MEAS - EDV(MOD-SP4): 73 ML
BH CV ECHO MEAS - EF(MOD-BP): 68.8 %
BH CV ECHO MEAS - EF(MOD-SP2): 67.7 %
BH CV ECHO MEAS - EF(MOD-SP4): 68.5 %
BH CV ECHO MEAS - ESV(CUBED): 7.6 ML
BH CV ECHO MEAS - ESV(MOD-SP2): 21 ML
BH CV ECHO MEAS - ESV(MOD-SP4): 23 ML
BH CV ECHO MEAS - FS: 33.6 %
BH CV ECHO MEAS - IVS/LVPW: 1.03 CM
BH CV ECHO MEAS - IVSD: 1.24 CM
BH CV ECHO MEAS - LAT PEAK E' VEL: 10.2 CM/SEC
BH CV ECHO MEAS - LV DIASTOLIC VOL/BSA (35-75): 46.3 CM2
BH CV ECHO MEAS - LV MASS(C)D: 110.9 GRAMS
BH CV ECHO MEAS - LV MAX PG: 5.5 MMHG
BH CV ECHO MEAS - LV MEAN PG: 2.8 MMHG
BH CV ECHO MEAS - LV SYSTOLIC VOL/BSA (12-30): 14.6 CM2
BH CV ECHO MEAS - LV V1 MAX: 116.9 CM/SEC
BH CV ECHO MEAS - LV V1 VTI: 26.9 CM
BH CV ECHO MEAS - LVIDD: 3 CM
BH CV ECHO MEAS - LVIDS: 1.97 CM
BH CV ECHO MEAS - LVOT AREA: 2.47 CM2
BH CV ECHO MEAS - LVOT DIAM: 1.77 CM
BH CV ECHO MEAS - LVPWD: 1.21 CM
BH CV ECHO MEAS - MED PEAK E' VEL: 7.2 CM/SEC
BH CV ECHO MEAS - MR MAX PG: 13.6 MMHG
BH CV ECHO MEAS - MR MAX VEL: 184.6 CM/SEC
BH CV ECHO MEAS - MV A DUR: 0.21 SEC
BH CV ECHO MEAS - MV A MAX VEL: 97.7 CM/SEC
BH CV ECHO MEAS - MV DEC SLOPE: 192.3 CM/SEC2
BH CV ECHO MEAS - MV DEC TIME: 0.3 SEC
BH CV ECHO MEAS - MV E MAX VEL: 64.3 CM/SEC
BH CV ECHO MEAS - MV E/A: 0.66
BH CV ECHO MEAS - MV MAX PG: 5.6 MMHG
BH CV ECHO MEAS - MV MEAN PG: 1.71 MMHG
BH CV ECHO MEAS - MV P1/2T: 122 MSEC
BH CV ECHO MEAS - MV V2 VTI: 29.1 CM
BH CV ECHO MEAS - MVA(P1/2T): 1.8 CM2
BH CV ECHO MEAS - MVA(VTI): 2.28 CM2
BH CV ECHO MEAS - PA ACC TIME: 0.08 SEC
BH CV ECHO MEAS - PA V2 MAX: 82.8 CM/SEC
BH CV ECHO MEAS - PULM A REVS DUR: 0.18 SEC
BH CV ECHO MEAS - PULM A REVS VEL: 50.1 CM/SEC
BH CV ECHO MEAS - PULM DIAS VEL: 40.3 CM/SEC
BH CV ECHO MEAS - PULM S/D: 0.91
BH CV ECHO MEAS - PULM SYS VEL: 36.8 CM/SEC
BH CV ECHO MEAS - QP/QS: 0.77
BH CV ECHO MEAS - RAP SYSTOLE: 8 MMHG
BH CV ECHO MEAS - RV MAX PG: 2.17 MMHG
BH CV ECHO MEAS - RV V1 MAX: 73.7 CM/SEC
BH CV ECHO MEAS - RV V1 VTI: 16.7 CM
BH CV ECHO MEAS - RVOT DIAM: 1.98 CM
BH CV ECHO MEAS - RVSP: 41 MMHG
BH CV ECHO MEAS - SV(LVOT): 66.6 ML
BH CV ECHO MEAS - SV(MOD-SP2): 44 ML
BH CV ECHO MEAS - SV(MOD-SP4): 50 ML
BH CV ECHO MEAS - SV(RVOT): 51.4 ML
BH CV ECHO MEAS - SVI(LVOT): 42.2 ML/M2
BH CV ECHO MEAS - SVI(MOD-SP2): 27.9 ML/M2
BH CV ECHO MEAS - SVI(MOD-SP4): 31.7 ML/M2
BH CV ECHO MEAS - TR MAX PG: 33.4 MMHG
BH CV ECHO MEAS - TR MAX VEL: 289.1 CM/SEC
BH CV ECHO MEASUREMENTS AVERAGE E/E' RATIO: 7.39
BH CV NUCLEAR PRIOR STUDY: 2
BH CV REST NUCLEAR ISOTOPE DOSE: 11.5 MCI
BH CV STRESS DURATION MIN STAGE 1: 0
BH CV STRESS DURATION SEC STAGE 1: 48
BH CV STRESS GRADE STAGE 1: 10
BH CV STRESS HR STAGE 1: 99
BH CV STRESS METS STAGE 1: 5
BH CV STRESS NUCLEAR ISOTOPE DOSE: 35.5 MCI
BH CV STRESS PROTOCOL 1: NORMAL
BH CV STRESS PROTOCOL 2 BP STAGE 1: NORMAL
BH CV STRESS PROTOCOL 2 COMMENTS STAGE 1: NORMAL
BH CV STRESS PROTOCOL 2 DOSE REGADENOSON STAGE 1: 0.4
BH CV STRESS PROTOCOL 2 DURATION MIN STAGE 1: 0
BH CV STRESS PROTOCOL 2 DURATION SEC STAGE 1: 10
BH CV STRESS PROTOCOL 2 HR STAGE 1: 99
BH CV STRESS PROTOCOL 2 STAGE 1: 1
BH CV STRESS PROTOCOL 2: NORMAL
BH CV STRESS RECOVERY BP: NORMAL MMHG
BH CV STRESS RECOVERY HR: 65 BPM
BH CV STRESS SPEED STAGE 1: 1.7
BH CV STRESS STAGE 1: 1
BH CV XLRA - RV BASE: 2.2 CM
BH CV XLRA - RV LENGTH: 6.4 CM
BH CV XLRA - RV MID: 2.5 CM
BH CV XLRA - TDI S': 8.7 CM/SEC
LEFT ATRIUM VOLUME INDEX: 15.4 ML/M2
MAXIMAL PREDICTED HEART RATE: 133 BPM
PERCENT MAX PREDICTED HR: 74.44 %
SINUS: 2.6 CM
STJ: 1.78 CM
STRESS BASELINE BP: NORMAL MMHG
STRESS BASELINE HR: 71 BPM
STRESS PERCENT HR: 88 %
STRESS POST EXERCISE DUR SEC: 10 SEC
STRESS POST PEAK BP: NORMAL MMHG
STRESS POST PEAK HR: 99 BPM
STRESS TARGET HR: 113 BPM

## 2024-08-21 PROCEDURE — A9502 TC99M TETROFOSMIN: HCPCS | Performed by: NURSE PRACTITIONER

## 2024-08-21 PROCEDURE — 93017 CV STRESS TEST TRACING ONLY: CPT

## 2024-08-21 PROCEDURE — 78452 HT MUSCLE IMAGE SPECT MULT: CPT

## 2024-08-21 PROCEDURE — 93306 TTE W/DOPPLER COMPLETE: CPT

## 2024-08-21 PROCEDURE — 25010000002 REGADENOSON 0.4 MG/5ML SOLUTION: Performed by: NURSE PRACTITIONER

## 2024-08-21 PROCEDURE — 25510000001 PERFLUTREN 6.52 MG/ML SUSPENSION 2 ML VIAL: Performed by: NURSE PRACTITIONER

## 2024-08-21 PROCEDURE — 0 TECHNETIUM TETROFOSMIN KIT: Performed by: NURSE PRACTITIONER

## 2024-08-21 RX ORDER — REGADENOSON 0.08 MG/ML
0.4 INJECTION, SOLUTION INTRAVENOUS
Status: COMPLETED | OUTPATIENT
Start: 2024-08-21 | End: 2024-08-21

## 2024-08-21 RX ADMIN — TETROFOSMIN 1 DOSE: 1.38 INJECTION, POWDER, LYOPHILIZED, FOR SOLUTION INTRAVENOUS at 13:45

## 2024-08-21 RX ADMIN — PERFLUTREN 2 ML: 6.52 INJECTION, SUSPENSION INTRAVENOUS at 13:43

## 2024-08-21 RX ADMIN — TETROFOSMIN 1 DOSE: 1.38 INJECTION, POWDER, LYOPHILIZED, FOR SOLUTION INTRAVENOUS at 15:00

## 2024-08-21 RX ADMIN — REGADENOSON 0.4 MG: 0.08 INJECTION, SOLUTION INTRAVENOUS at 15:00

## 2024-08-22 ENCOUNTER — TELEPHONE (OUTPATIENT)
Dept: CARDIOLOGY | Facility: CLINIC | Age: 89
End: 2024-08-22
Payer: MEDICARE

## 2024-08-22 PROBLEM — I27.20 PULMONARY HYPERTENSION: Status: ACTIVE | Noted: 2024-08-22

## 2024-08-22 PROBLEM — I36.1 NONRHEUMATIC TRICUSPID VALVE REGURGITATION: Status: ACTIVE | Noted: 2024-08-22

## 2024-08-22 RX ORDER — ISOSORBIDE MONONITRATE 30 MG/1
30 TABLET, EXTENDED RELEASE ORAL DAILY
Qty: 30 TABLET | Refills: 1 | Status: SHIPPED | OUTPATIENT
Start: 2024-08-22

## 2024-08-22 NOTE — PROGRESS NOTES
Stress test normal. She had jaw pain yesterday. I recommended starting isosorbide 30 mg 1 tablet daily.

## 2024-08-22 NOTE — PROGRESS NOTES
Patient informed about results and verbalizes understanding.  She denies shortness of breath so we will watch the tricuspid regurgitation and pulmonary hypertension.

## 2024-08-22 NOTE — TELEPHONE ENCOUNTER
Echocardiogram showed normal EF, mild other abnormalities, severe tricuspid regurgitation, and moderate pulmonary hypertension.  She has never been tested for sleep apnea and she does not know if she snores.  She denies any shortness of breath.  I recommend that we just continue to monitor.    She had stress testing was done for intermittent jaw pain.  She had an episode of jaw pain yesterday.  She says she starts to feel a tension coming on and then has the jaw pain.  She had 2 episodes with walking and 1 episode at rest.  Stress testing was normal.    I recommended a trial of isosorbide 30 mg 1 tablet daily in the morning.  We discussed potential adverse effects.  Will see if this resolves her jaw pain.  If she continues to have the jaw pain on isosorbide, I will recommend follow-up with her PCP and dentist.  We will check on her via telephone in a month.

## 2024-08-22 NOTE — PROGRESS NOTES
She denies SOB. She has occasional jaw pain. Ms. Navarrete was informed of results and verbalized understanding.

## 2024-08-22 NOTE — PROGRESS NOTES
Normal.  Will do a trial of isosorbide 30 mg daily.  Results discussed with patient and she verbalizes understanding.

## 2024-09-23 ENCOUNTER — TELEPHONE (OUTPATIENT)
Dept: CARDIOLOGY | Facility: CLINIC | Age: 89
End: 2024-09-23
Payer: MEDICARE

## 2024-09-24 RX ORDER — ISOSORBIDE MONONITRATE 30 MG/1
30 TABLET, EXTENDED RELEASE ORAL DAILY
Qty: 90 TABLET | Refills: 1 | Status: SHIPPED | OUTPATIENT
Start: 2024-09-24

## 2024-10-10 ENCOUNTER — OFFICE VISIT (OUTPATIENT)
Dept: CARDIOLOGY | Facility: CLINIC | Age: 89
End: 2024-10-10
Payer: MEDICARE

## 2024-10-10 ENCOUNTER — TELEPHONE (OUTPATIENT)
Dept: CARDIOLOGY | Facility: CLINIC | Age: 89
End: 2024-10-10
Payer: MEDICARE

## 2024-10-10 VITALS
BODY MASS INDEX: 25.92 KG/M2 | HEART RATE: 67 BPM | HEIGHT: 59 IN | WEIGHT: 128.6 LBS | SYSTOLIC BLOOD PRESSURE: 126 MMHG | DIASTOLIC BLOOD PRESSURE: 78 MMHG

## 2024-10-10 DIAGNOSIS — I25.10 NONOCCLUSIVE CORONARY ATHEROSCLEROSIS OF NATIVE CORONARY ARTERY: Primary | ICD-10-CM

## 2024-10-10 DIAGNOSIS — I48.0 PAROXYSMAL ATRIAL FIBRILLATION: ICD-10-CM

## 2024-10-10 DIAGNOSIS — Z95.0 S/P PLACEMENT OF CARDIAC PACEMAKER: ICD-10-CM

## 2024-10-10 DIAGNOSIS — I49.5 TACHYCARDIA-BRADYCARDIA SYNDROME: ICD-10-CM

## 2024-10-10 DIAGNOSIS — E87.1 CHRONIC HYPONATREMIA: ICD-10-CM

## 2024-10-10 DIAGNOSIS — E78.2 MIXED HYPERLIPIDEMIA: ICD-10-CM

## 2024-10-10 DIAGNOSIS — R68.84 JAW PAIN: ICD-10-CM

## 2024-10-10 RX ORDER — METOPROLOL SUCCINATE 50 MG/1
1 TABLET, EXTENDED RELEASE ORAL 2 TIMES DAILY
COMMUNITY
Start: 2024-07-11

## 2024-10-10 NOTE — TELEPHONE ENCOUNTER
Pt called this am. She has been having chest tightness and jaw pain. She wanted to come in and see Elisabeth today, but Elisabeth did not have any appts open. Scheduled her to see Jennifer this afternoon. Pt verbalized understanding.    Thank you,    Juanis Altamirano, RN  [place]riage [facility] [place]  10/10/24 08:10 EDT
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
## 2024-10-28 ENCOUNTER — OFFICE VISIT (OUTPATIENT)
Dept: CARDIOLOGY | Facility: CLINIC | Age: 89
End: 2024-10-28
Payer: MEDICARE

## 2024-10-28 VITALS
HEIGHT: 59 IN | BODY MASS INDEX: 27.62 KG/M2 | DIASTOLIC BLOOD PRESSURE: 80 MMHG | HEART RATE: 63 BPM | WEIGHT: 137 LBS | SYSTOLIC BLOOD PRESSURE: 120 MMHG

## 2024-10-28 DIAGNOSIS — E87.1 CHRONIC HYPONATREMIA: ICD-10-CM

## 2024-10-28 DIAGNOSIS — I27.20 PULMONARY HYPERTENSION: ICD-10-CM

## 2024-10-28 DIAGNOSIS — I49.5 TACHYCARDIA-BRADYCARDIA SYNDROME: ICD-10-CM

## 2024-10-28 DIAGNOSIS — I36.1 NONRHEUMATIC TRICUSPID VALVE REGURGITATION: ICD-10-CM

## 2024-10-28 DIAGNOSIS — Z95.0 S/P PLACEMENT OF CARDIAC PACEMAKER: ICD-10-CM

## 2024-10-28 DIAGNOSIS — I10 PRIMARY HYPERTENSION: ICD-10-CM

## 2024-10-28 DIAGNOSIS — R68.84 JAW PAIN: ICD-10-CM

## 2024-10-28 DIAGNOSIS — R07.89 CHEST TIGHTNESS: ICD-10-CM

## 2024-10-28 DIAGNOSIS — I25.110 CORONARY ARTERY DISEASE INVOLVING NATIVE CORONARY ARTERY OF NATIVE HEART WITH UNSTABLE ANGINA PECTORIS: Primary | ICD-10-CM

## 2024-10-28 DIAGNOSIS — Z86.79 HX OF ATRIAL FLUTTER: ICD-10-CM

## 2024-10-28 DIAGNOSIS — E78.2 MIXED HYPERLIPIDEMIA: ICD-10-CM

## 2024-10-28 DIAGNOSIS — I48.0 PAROXYSMAL ATRIAL FIBRILLATION: ICD-10-CM

## 2024-10-28 PROCEDURE — 93000 ELECTROCARDIOGRAM COMPLETE: CPT | Performed by: NURSE PRACTITIONER

## 2024-10-28 PROCEDURE — 99214 OFFICE O/P EST MOD 30 MIN: CPT | Performed by: NURSE PRACTITIONER

## 2024-10-28 PROCEDURE — 1159F MED LIST DOCD IN RCRD: CPT | Performed by: NURSE PRACTITIONER

## 2024-10-28 PROCEDURE — 1160F RVW MEDS BY RX/DR IN RCRD: CPT | Performed by: NURSE PRACTITIONER

## 2024-10-28 RX ORDER — NITROGLYCERIN 0.4 MG/1
TABLET SUBLINGUAL
Qty: 30 TABLET | Refills: 1 | Status: SHIPPED | OUTPATIENT
Start: 2024-10-28

## 2024-10-28 RX ORDER — SERTRALINE HYDROCHLORIDE 25 MG/1
25 TABLET, FILM COATED ORAL DAILY
COMMUNITY

## 2024-10-28 NOTE — PROGRESS NOTES
Date of Office Visit: 10/28/2024  Encounter Provider: KELVIN Chicas  Place of Service: [facility] CARDIOLOGY  Patient Name: Brina Navarrete  :1935  Primary Cardiologist: Dr. Anderson Cordova    Chief Complaint   Patient presents with    Coronary Artery Disease   :     HPI: Brina Navarrete is a 89 y.o. female who presents today for cardiac follow-up visit.  I have reviewed her medical records.    She has known hypertension, hyperlipidemia, varicose veins, and chronic hyponatremia.    In , she was diagnosed with coronary artery disease with a 60% stenosis in the mid-distal LAD with medical treatment recommended.  She has been followed by cardiology and electrophysiology in [facility].    In , she was diagnosed with atrial flutter and underwent successful ablation.  She then developed atrial fibrillation and tachybradycardia syndrome and underwent implantation of dual-chamber MDT pacemaker.  She continues to follow with her electrophysiologist in [facility] until she has her generator change in the future.    In 2024, her pacemaker download showed a 15-hour episode of atrial fibrillation and her EP doctor increased her metoprolol.  She followed up with me in the office and reported intermittent jaw discomfort.  We decided to see how she would do on the increased dose of metoprolol.    She continued to have jaw discomfort.  I ordered the echocardiogram and stress test.  Myocardial perfusion study was normal. Echocardiogram showed normal EF, mild other abnormalities, severe tricuspid regurgitation, and moderate pulmonary hypertension.  She has never been tested for sleep apnea.  I started her on isosorbide 30 mg daily.    On 10/10/2024, she called our office and reported chest tightness and jaw pain.  Her symptoms resolved after placing aspirin under her tongue.  She also reported anxiety.  They discussed the possibility of increasing isosorbide to 60 mg daily and  she wanted to follow-up with her PCP about the anxiety first.    She presents today with her daughter accompanying her.  She continues to experience nonexertional jaw pain and chest tightness.  She said the symptoms are making her feel more anxious and her PCP recently started her on Zoloft.  She denies shortness of breath, palpitations, dizziness, or syncope.  She is experiencing nocturia.      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    Severe per echo 8/2024      Paroxysmal atrial fibrillation     Pulmonary hypertension 08/22/2024    Moderate per echo 8/2024      Tachycardia-bradycardia syndrome     s/p MDT dual chamber PPM 2015    Varicose veins of both lower extremities        Past Surgical History:   Procedure Laterality Date    ABLATION OF DYSRHYTHMIC FOCUS  8/24/2015    Procedure to correct abnormal heart rhythm    CARDIAC CATHETERIZATION  05/21/14    INSERT / REPLACE / REMOVE PACEMAKER  4/20/2015    Insertion of permanent pacemaker upper and lower chamber    PACEMAKER IMPLANTATION      [employer]       Social History     Socioeconomic History    Marital status:     Number of children: 2   Tobacco Use    Smoking status: Former     Current packs/day: 0.50     Average packs/day: 0.5 packs/day for 25.0 years (12.5 ttl pk-yrs)     Types: Cigarettes    Smokeless tobacco: Never    Tobacco comments:     Social smoker only   Vaping Use    Vaping status: Never Used   Substance and Sexual Activity    Alcohol use: Yes     Comment: 2 or 3 glasses of wine weekly    Drug use: Never    Sexual activity: Not Currently     Partners: Male     Birth control/protection: Pill, Diaphragm     Comment: Marriage only       Family History   Problem Relation Age of  "Onset    Arrhythmia Father         Age  78, a heart attack , recovered    Heart attack Father     Arrhythmia Brother         Ar rhythmic in 50’s,  stroke at  73       The following portion of the patient's history were reviewed and updated as appropriate: past medical history, past surgical history, past social history, past family history, allergies, current medications, and problem list.    Review of Systems   Constitutional: Negative.   Cardiovascular:  Positive for chest pain.        Bilateral jaw pain   Respiratory: Negative.     Hematologic/Lymphatic: Negative.    Musculoskeletal:  Positive for joint pain.   Neurological: Negative.    Psychiatric/Behavioral:  The patient is nervous/anxious.        Allergies   Allergen Reactions    Ace Inhibitors Cough and Other (See Comments)     Causes cough         Current Outpatient Medications:     acetaminophen (TYLENOL) 500 MG tablet, Take 1 tablet by mouth Every 6 (Six) Hours As Needed for Mild Pain., Disp: , Rfl:     atorvastatin (LIPITOR) 20 MG tablet, Daily., Disp: , Rfl:     Eliquis 5 MG tablet tablet, Every 12 (Twelve) Hours., Disp: , Rfl:     isosorbide mononitrate (IMDUR) 30 MG 24 hr tablet, Take 1 tablet by mouth Daily., Disp: 90 tablet, Rfl: 1    metoprolol succinate XL (TOPROL-XL) 50 MG 24 hr tablet, Take 1 tablet by mouth 2 (Two) Times a Day., Disp: , Rfl:     multivitamin with minerals (Centrum Silver 50+Women) tablet tablet, Take 1 tablet by mouth Daily., Disp: , Rfl:     sertraline (ZOLOFT) 25 MG tablet, Take 1 tablet by mouth Daily., Disp: , Rfl:     vitamin D3 125 MCG (5000 UT) capsule capsule, Take 1 capsule by mouth Daily. OTC, Disp: , Rfl:     nitroglycerin (NITROSTAT) 0.4 MG SL tablet, 1 under the tongue as needed for angina, may repeat q5mins for up three doses, Disp: 30 tablet, Rfl: 1         Objective:     Vitals:    10/28/24 1328   BP: 120/80   Pulse: 63   Weight: 62.1 kg (137 lb)   Height: 149.9 cm (59\")     Body mass index is 27.67 " kg/m².    PHYSICAL EXAM:    Vitals Reviewed.   General Appearance: No acute distress, well developed and well nourished.   HENT: No hearing loss noted.    Respiratory: No signs of respiratory distress. Respiration rhythm and depth normal.  Clear to auscultation.   Cardiovascular:  Jugular Venous Pressure: Normal  Heart Rate and Rhythm: Normal, Heart Sounds: Normal S1 and S2. No S3 or S4 noted.  Murmurs: No murmurs noted. No rubs, thrills, or gallops.   Lower Extremities: No edema noted.  Musculoskeletal: Normal movement of extremities.  Skin: General appearance normal.    Psychiatric: Patient alert and oriented to person, place, and time. Speech and behavior appropriate. Normal mood and affect.       ECG 12 Lead    Date/Time: 10/28/2024 1:31 PM  Performed by: Elisabeth Fischer APRN    Authorized by: Elisabeth Fischer APRN  Comparison: compared with previous ECG from 10/10/2024  Similar to previous ECG  Rhythm: sinus rhythm and paced  Rate: normal  BPM: 63  Conduction: conduction normal  ST Segments: ST segments normal  T Waves: T waves normal  QRS axis: normal  Pacing: atrial sensed rhythm and 100% capture  Clinical impression: abnormal EKG            Assessment:       Diagnosis Plan   1. Coronary artery disease involving native coronary artery of native heart with unstable angina pectoris  Case Request Cath Lab: Left Heart Cath, Left ventriculography    Pre-Procedure IV Hydration Not Needed    Case Request Cath Lab: Left Heart Cath, Left ventriculography      2. Chest tightness        3. Jaw pain        4. Tachycardia-bradycardia syndrome        5. S/P placement of cardiac pacemaker        6. Hx of atrial flutter        7. Paroxysmal atrial fibrillation        8. Nonrheumatic tricuspid valve regurgitation        9. Pulmonary hypertension        10. Primary hypertension        11. Mixed hyperlipidemia        12. Chronic hyponatremia               Plan:       1-3.  Coronary Artery Disease: History of 60% LAD stenosis  per coronary angiography in 2014.  Since at least July, she has been experiencing intermittent jaw pain and chest tightness and now it is occurring with nonexertional activities.  We have tried her on isosorbide and her symptoms have not improved.  Risk versus benefits of proceeding with coronary angiogram discussed.  She would like to proceed.  Continue current medications.  In the event that she has any worsening symptoms, she would need to present to the ED.    4/5.  History of tachybradycardia syndrome.  Status post pacemaker placement.  Follows with her EP in [facility].  She wants to do so until she has her generator changed out in the future.    6. History of atrial flutter: Status post ablation in 2015.     7.  Paroxysmal Atrial Fibrillation: Diagnosed in 2015.  Continue metoprolol tartrate.  She remains on apixaban for [id number] score of 5.  She had recurrent atrial fibrillation over the summer and her EP MD increased her metoprolol.    8/9.  Severe tricuspid regurgitation moderate pulmonary hypertension noted per echocardiogram.    10.  Hypertension: Blood pressure well-controlled today.    11.  Hyperlipidemia: Continue atorvastatin.    12.  Chronic hyponatremia.    13.  Further recommendations to follow after coronary angiogram is completed.    As always, it has been a pleasure to participate in your patient's care. Thank you.         Sincerely,         KELVIN Kemp  [facility]      Dictated utilizing Dragon Dictation  I spent 31 minutes reviewing her medical records/testing/previous office notes/labs, face-to-face interaction with patient, physical examination, formulating the plan of care, and discussion of plan of care with patient.

## 2024-10-28 NOTE — H&P (VIEW-ONLY)
Date of Office Visit: 10/28/2024  Encounter Provider: KELVIN Chicas  Place of Service: [facility] CARDIOLOGY  Patient Name: Brina Navarrete  :1935  Primary Cardiologist: Dr. Anderson Cordova    Chief Complaint   Patient presents with    Coronary Artery Disease   :     HPI: Brina Navarrete is a 89 y.o. female who presents today for cardiac follow-up visit.  I have reviewed her medical records.    She has known hypertension, hyperlipidemia, varicose veins, and chronic hyponatremia.    In , she was diagnosed with coronary artery disease with a 60% stenosis in the mid-distal LAD with medical treatment recommended.  She has been followed by cardiology and electrophysiology in [facility].    In , she was diagnosed with atrial flutter and underwent successful ablation.  She then developed atrial fibrillation and tachybradycardia syndrome and underwent implantation of dual-chamber MDT pacemaker.  She continues to follow with her electrophysiologist in [facility] until she has her generator change in the future.    In 2024, her pacemaker download showed a 15-hour episode of atrial fibrillation and her EP doctor increased her metoprolol.  She followed up with me in the office and reported intermittent jaw discomfort.  We decided to see how she would do on the increased dose of metoprolol.    She continued to have jaw discomfort.  I ordered the echocardiogram and stress test.  Myocardial perfusion study was normal. Echocardiogram showed normal EF, mild other abnormalities, severe tricuspid regurgitation, and moderate pulmonary hypertension.  She has never been tested for sleep apnea.  I started her on isosorbide 30 mg daily.    On 10/10/2024, she called our office and reported chest tightness and jaw pain.  Her symptoms resolved after placing aspirin under her tongue.  She also reported anxiety.  They discussed the possibility of increasing isosorbide to 60 mg daily and  she wanted to follow-up with her PCP about the anxiety first.    She presents today with her daughter accompanying her.  She continues to experience nonexertional jaw pain and chest tightness.  She said the symptoms are making her feel more anxious and her PCP recently started her on Zoloft.  She denies shortness of breath, palpitations, dizziness, or syncope.  She is experiencing nocturia.      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    Severe per echo 8/2024      Paroxysmal atrial fibrillation     Pulmonary hypertension 08/22/2024    Moderate per echo 8/2024      Tachycardia-bradycardia syndrome     s/p MDT dual chamber PPM 2015    Varicose veins of both lower extremities        Past Surgical History:   Procedure Laterality Date    ABLATION OF DYSRHYTHMIC FOCUS  8/24/2015    Procedure to correct abnormal heart rhythm    CARDIAC CATHETERIZATION  05/21/14    INSERT / REPLACE / REMOVE PACEMAKER  4/20/2015    Insertion of permanent pacemaker upper and lower chamber    PACEMAKER IMPLANTATION      [employer]       Social History     Socioeconomic History    Marital status:     Number of children: 2   Tobacco Use    Smoking status: Former     Current packs/day: 0.50     Average packs/day: 0.5 packs/day for 25.0 years (12.5 ttl pk-yrs)     Types: Cigarettes    Smokeless tobacco: Never    Tobacco comments:     Social smoker only   Vaping Use    Vaping status: Never Used   Substance and Sexual Activity    Alcohol use: Yes     Comment: 2 or 3 glasses of wine weekly    Drug use: Never    Sexual activity: Not Currently     Partners: Male     Birth control/protection: Pill, Diaphragm     Comment: Marriage only       Family History   Problem Relation Age of  "Onset    Arrhythmia Father         Age  78, a heart attack , recovered    Heart attack Father     Arrhythmia Brother         Ar rhythmic in 50’s,  stroke at  73       The following portion of the patient's history were reviewed and updated as appropriate: past medical history, past surgical history, past social history, past family history, allergies, current medications, and problem list.    Review of Systems   Constitutional: Negative.   Cardiovascular:  Positive for chest pain.        Bilateral jaw pain   Respiratory: Negative.     Hematologic/Lymphatic: Negative.    Musculoskeletal:  Positive for joint pain.   Neurological: Negative.    Psychiatric/Behavioral:  The patient is nervous/anxious.        Allergies   Allergen Reactions    Ace Inhibitors Cough and Other (See Comments)     Causes cough         Current Outpatient Medications:     acetaminophen (TYLENOL) 500 MG tablet, Take 1 tablet by mouth Every 6 (Six) Hours As Needed for Mild Pain., Disp: , Rfl:     atorvastatin (LIPITOR) 20 MG tablet, Daily., Disp: , Rfl:     Eliquis 5 MG tablet tablet, Every 12 (Twelve) Hours., Disp: , Rfl:     isosorbide mononitrate (IMDUR) 30 MG 24 hr tablet, Take 1 tablet by mouth Daily., Disp: 90 tablet, Rfl: 1    metoprolol succinate XL (TOPROL-XL) 50 MG 24 hr tablet, Take 1 tablet by mouth 2 (Two) Times a Day., Disp: , Rfl:     multivitamin with minerals (Centrum Silver 50+Women) tablet tablet, Take 1 tablet by mouth Daily., Disp: , Rfl:     sertraline (ZOLOFT) 25 MG tablet, Take 1 tablet by mouth Daily., Disp: , Rfl:     vitamin D3 125 MCG (5000 UT) capsule capsule, Take 1 capsule by mouth Daily. OTC, Disp: , Rfl:     nitroglycerin (NITROSTAT) 0.4 MG SL tablet, 1 under the tongue as needed for angina, may repeat q5mins for up three doses, Disp: 30 tablet, Rfl: 1         Objective:     Vitals:    10/28/24 1328   BP: 120/80   Pulse: 63   Weight: 62.1 kg (137 lb)   Height: 149.9 cm (59\")     Body mass index is 27.67 " kg/m².    PHYSICAL EXAM:    Vitals Reviewed.   General Appearance: No acute distress, well developed and well nourished.   HENT: No hearing loss noted.    Respiratory: No signs of respiratory distress. Respiration rhythm and depth normal.  Clear to auscultation.   Cardiovascular:  Jugular Venous Pressure: Normal  Heart Rate and Rhythm: Normal, Heart Sounds: Normal S1 and S2. No S3 or S4 noted.  Murmurs: No murmurs noted. No rubs, thrills, or gallops.   Lower Extremities: No edema noted.  Musculoskeletal: Normal movement of extremities.  Skin: General appearance normal.    Psychiatric: Patient alert and oriented to person, place, and time. Speech and behavior appropriate. Normal mood and affect.       ECG 12 Lead    Date/Time: 10/28/2024 1:31 PM  Performed by: Elisabeth Fischer APRN    Authorized by: Elisabeth Fischer APRN  Comparison: compared with previous ECG from 10/10/2024  Similar to previous ECG  Rhythm: sinus rhythm and paced  Rate: normal  BPM: 63  Conduction: conduction normal  ST Segments: ST segments normal  T Waves: T waves normal  QRS axis: normal  Pacing: atrial sensed rhythm and 100% capture  Clinical impression: abnormal EKG            Assessment:       Diagnosis Plan   1. Coronary artery disease involving native coronary artery of native heart with unstable angina pectoris  Case Request Cath Lab: Left Heart Cath, Left ventriculography    Pre-Procedure IV Hydration Not Needed    Case Request Cath Lab: Left Heart Cath, Left ventriculography      2. Chest tightness        3. Jaw pain        4. Tachycardia-bradycardia syndrome        5. S/P placement of cardiac pacemaker        6. Hx of atrial flutter        7. Paroxysmal atrial fibrillation        8. Nonrheumatic tricuspid valve regurgitation        9. Pulmonary hypertension        10. Primary hypertension        11. Mixed hyperlipidemia        12. Chronic hyponatremia               Plan:       1-3.  Coronary Artery Disease: History of 60% LAD stenosis  per coronary angiography in 2014.  Since at least July, she has been experiencing intermittent jaw pain and chest tightness and now it is occurring with nonexertional activities.  We have tried her on isosorbide and her symptoms have not improved.  Risk versus benefits of proceeding with coronary angiogram discussed.  She would like to proceed.  Continue current medications.  In the event that she has any worsening symptoms, she would need to present to the ED.    4/5.  History of tachybradycardia syndrome.  Status post pacemaker placement.  Follows with her EP in [facility].  She wants to do so until she has her generator changed out in the future.    6. History of atrial flutter: Status post ablation in 2015.     7.  Paroxysmal Atrial Fibrillation: Diagnosed in 2015.  Continue metoprolol tartrate.  She remains on apixaban for [id number] score of 5.  She had recurrent atrial fibrillation over the summer and her EP MD increased her metoprolol.    8/9.  Severe tricuspid regurgitation moderate pulmonary hypertension noted per echocardiogram.    10.  Hypertension: Blood pressure well-controlled today.    11.  Hyperlipidemia: Continue atorvastatin.    12.  Chronic hyponatremia.    13.  Further recommendations to follow after coronary angiogram is completed.    As always, it has been a pleasure to participate in your patient's care. Thank you.         Sincerely,         KELVIN Kemp  [facility] Cardiology      Dictated utilizing Dragon Dictation  I spent 31 minutes reviewing her medical records/testing/previous office notes/labs, face-to-face interaction with patient, physical examination, formulating the plan of care, and discussion of plan of care with patient.

## 2024-11-07 ENCOUNTER — HOSPITAL ENCOUNTER (OUTPATIENT)
Facility: HOSPITAL | Age: 89
Setting detail: HOSPITAL OUTPATIENT SURGERY
Discharge: HOME | End: 2024-11-07
Attending: INTERNAL MEDICINE | Admitting: INTERNAL MEDICINE
Payer: MEDICARE

## 2024-11-07 ENCOUNTER — TELEPHONE (OUTPATIENT)
Dept: CARDIOLOGY | Facility: CLINIC | Age: 89
End: 2024-11-07
Payer: MEDICARE

## 2024-11-07 VITALS
BODY MASS INDEX: 26.7 KG/M2 | WEIGHT: 136 LBS | OXYGEN SATURATION: 95 % | HEIGHT: 60 IN | TEMPERATURE: 97.4 F | RESPIRATION RATE: 16 BRPM | SYSTOLIC BLOOD PRESSURE: 131 MMHG | HEART RATE: 60 BPM | DIASTOLIC BLOOD PRESSURE: 51 MMHG

## 2024-11-07 DIAGNOSIS — I25.110 CORONARY ARTERY DISEASE INVOLVING NATIVE CORONARY ARTERY OF NATIVE HEART WITH UNSTABLE ANGINA PECTORIS: ICD-10-CM

## 2024-11-07 PROCEDURE — C1769 GUIDE WIRE: HCPCS | Performed by: INTERNAL MEDICINE

## 2024-11-07 PROCEDURE — 25510000001 IOPAMIDOL PER 1 ML: Performed by: INTERNAL MEDICINE

## 2024-11-07 PROCEDURE — 93458 L HRT ARTERY/VENTRICLE ANGIO: CPT | Performed by: INTERNAL MEDICINE

## 2024-11-07 PROCEDURE — 25010000002 FENTANYL CITRATE (PF) 50 MCG/ML SOLUTION: Performed by: INTERNAL MEDICINE

## 2024-11-07 PROCEDURE — 25810000003 SODIUM CHLORIDE 0.9 % SOLUTION: Performed by: INTERNAL MEDICINE

## 2024-11-07 PROCEDURE — 25010000002 LIDOCAINE 2% SOLUTION: Performed by: INTERNAL MEDICINE

## 2024-11-07 PROCEDURE — 25010000002 MIDAZOLAM PER 1 MG: Performed by: INTERNAL MEDICINE

## 2024-11-07 PROCEDURE — 25010000002 HEPARIN (PORCINE) PER 1000 UNITS: Performed by: INTERNAL MEDICINE

## 2024-11-07 PROCEDURE — C1894 INTRO/SHEATH, NON-LASER: HCPCS | Performed by: INTERNAL MEDICINE

## 2024-11-07 RX ORDER — SODIUM CHLORIDE 9 MG/ML
75 INJECTION, SOLUTION INTRAVENOUS CONTINUOUS
Status: DISCONTINUED | OUTPATIENT
Start: 2024-11-07 | End: 2024-11-07 | Stop reason: HOSPADM

## 2024-11-07 RX ORDER — SODIUM CHLORIDE 0.9 % (FLUSH) 0.9 %
10 SYRINGE (ML) INJECTION AS NEEDED
Status: DISCONTINUED | OUTPATIENT
Start: 2024-11-07 | End: 2024-11-07 | Stop reason: HOSPADM

## 2024-11-07 RX ORDER — HEPARIN SODIUM 1000 [USP'U]/ML
INJECTION, SOLUTION INTRAVENOUS; SUBCUTANEOUS
Status: DISCONTINUED | OUTPATIENT
Start: 2024-11-07 | End: 2024-11-07 | Stop reason: HOSPADM

## 2024-11-07 RX ORDER — MIDAZOLAM HYDROCHLORIDE 1 MG/ML
INJECTION, SOLUTION INTRAMUSCULAR; INTRAVENOUS
Status: DISCONTINUED | OUTPATIENT
Start: 2024-11-07 | End: 2024-11-07 | Stop reason: HOSPADM

## 2024-11-07 RX ORDER — VERAPAMIL HYDROCHLORIDE 2.5 MG/ML
INJECTION, SOLUTION INTRAVENOUS
Status: DISCONTINUED | OUTPATIENT
Start: 2024-11-07 | End: 2024-11-07 | Stop reason: HOSPADM

## 2024-11-07 RX ORDER — LIDOCAINE HYDROCHLORIDE 20 MG/ML
INJECTION, SOLUTION INFILTRATION; PERINEURAL
Status: DISCONTINUED | OUTPATIENT
Start: 2024-11-07 | End: 2024-11-07 | Stop reason: HOSPADM

## 2024-11-07 RX ORDER — SODIUM CHLORIDE 9 MG/ML
40 INJECTION, SOLUTION INTRAVENOUS AS NEEDED
Status: DISCONTINUED | OUTPATIENT
Start: 2024-11-07 | End: 2024-11-07 | Stop reason: HOSPADM

## 2024-11-07 RX ORDER — SODIUM CHLORIDE 0.9 % (FLUSH) 0.9 %
10 SYRINGE (ML) INJECTION EVERY 12 HOURS SCHEDULED
Status: DISCONTINUED | OUTPATIENT
Start: 2024-11-07 | End: 2024-11-07 | Stop reason: HOSPADM

## 2024-11-07 RX ORDER — ISOSORBIDE MONONITRATE 60 MG/1
60 TABLET, EXTENDED RELEASE ORAL DAILY
Qty: 90 TABLET | Refills: 3 | Status: SHIPPED | OUTPATIENT
Start: 2024-11-07

## 2024-11-07 RX ORDER — ACETAMINOPHEN 325 MG/1
650 TABLET ORAL EVERY 4 HOURS PRN
Status: DISCONTINUED | OUTPATIENT
Start: 2024-11-07 | End: 2024-11-07 | Stop reason: HOSPADM

## 2024-11-07 RX ORDER — IOPAMIDOL 755 MG/ML
INJECTION, SOLUTION INTRAVASCULAR
Status: DISCONTINUED | OUTPATIENT
Start: 2024-11-07 | End: 2024-11-07 | Stop reason: HOSPADM

## 2024-11-07 RX ORDER — FENTANYL CITRATE 50 UG/ML
INJECTION, SOLUTION INTRAMUSCULAR; INTRAVENOUS
Status: DISCONTINUED | OUTPATIENT
Start: 2024-11-07 | End: 2024-11-07 | Stop reason: HOSPADM

## 2024-11-07 RX ADMIN — ACETAMINOPHEN 325MG 650 MG: 325 TABLET ORAL at 12:50

## 2024-11-07 RX ADMIN — SODIUM CHLORIDE 75 ML/HR: 9 INJECTION, SOLUTION INTRAVENOUS at 07:43

## 2024-11-07 NOTE — Clinical Note
Hemostasis started on the right radial artery. Manual pressure applied to vessel. Manual pressure was held by LUIS RT. Manual pressure was held for 5 min. Hemostasis achieved successfully. Closure device additional comment: tensoplast

## 2024-11-07 NOTE — DISCHARGE INSTRUCTIONS
[facility]  [address] [place] [address]    Coronary Angiogram (Radial/Ulnar Approach) After Care    Refer to this sheet in the next few weeks. These instructions provide you with information on caring for yourself after your procedure. Your caregiver may also give you more specific instructions. Your treatment has been planned according to current medical practices, but problems sometimes occur. Call your caregiver if you have any problems or questions after your procedure.    Home Care Instructions:  You may shower the day after the procedure. Remove the bandage (dressing) and gently wash the site with plain soap and water. Gently pat the site dry. You may apply a band aid daily for 2 days if desired.    Do not apply powder or lotion to the site.  Do not submerge the affected site in water for 3 to 5 days or until the site is completely healed.   Do not lift, push or pull anything over 5 pounds for 5 days after your procedure or as directed by your physician.  As a reference, a gallon of milk weighs 8 pounds.   Inspect the site at least twice daily. You may notice some bruising at the site and it may be tender for 1 to 2 weeks.     Increase your fluid intake for the next 2 days.    Keep arm elevated for 24 hours. For the remainder of the day, keep your arm in “Pledge of Allegiance” position when up and about.     You may drive 24 hours after the procedure unless otherwise instructed by your caregiver.  Do not operate machinery or power tools for 24 hours.  A responsible adult should be with you for the first 24 hours after you arrive home. Do not make any important legal decisions or sign legal papers for 24 hours.  Do not drink alcohol for 24 hours.    Metformin or any medications containing Metformin should not be taken for 48 hours after your procedure.      Call Your Doctor if:   You have unusual pain at the radial/ulnar (wrist) site.  You have redness, warmth, swelling, or pain at the  radial/ulnar (wrist) site.  You have drainage (other than a small amount of blood on the dressing).  `You have chills or a fever > 101.  Your arm becomes pale or dark, cool, tingly, or numb.  You develop chest pain, shortness of breath, feel faint or pass out.    You have heavy bleeding from the site, hold pressure on the site for 20 minutes.  If the bleeding stops, apply a fresh bandage and call your cardiologist.  However, if you        continue to have bleeding, call 911 and continue to apply pressure to the site.   You have any symptoms of a stroke.  Remember BE FAST  B-balance. Sudden trouble walking or loss of balance.  E-eyes.  Sudden changes in how you see or a sudden onset of a very bad headache.   F-face. Sudden weakness or loss of feeling of the face or facial droop on one side.   A-arms Sudden weakness or numbness in one arm.  One arm drifts down if they are both held out in front of you. This happens suddenly and usually on one side of the body.   S-speech.  Sudden trouble speaking, slurred speech or trouble understanding what are saying.   T-time  Time to call emergency services.  Write down the symptoms and the time they started.

## 2024-11-07 NOTE — TELEPHONE ENCOUNTER
Please arrange for a 1 week hospital follow-up visit with me.  Please cancel appointment with Dr. Cordova in January.  Let patient know that she will be out of the office at that time.  Please arrange a follow-up appointment with Dr. Cordova in February.    Thank you

## 2024-11-07 NOTE — PERIOPERATIVE NURSING NOTE
Dr. Rose bedside speaking with pt and pt family members. Aware of soft hematoma and swelling. Verbal order to hold 20 min pressure. Will continue to monitor closely. Charge RN bedside.

## 2024-11-07 NOTE — Clinical Note
Addended by: KATT MCDANIEL on: 6/14/2024 03:30 PM     Modules accepted: Orders     Post-Procedural Saturation was taken from the Right Hand. Sat result is 93%.

## 2024-11-08 NOTE — TELEPHONE ENCOUNTER
Patient scheduled for 11/13/24. Patient will reschedule her January appointment with Dr. Cordova to February when checking out on 11/13/24. Patient's daughter normally does the scheduling and she was not with her when I called to schedule the hospital follow up.

## 2024-11-13 ENCOUNTER — OFFICE VISIT (OUTPATIENT)
Dept: CARDIOLOGY | Facility: CLINIC | Age: 89
End: 2024-11-13
Payer: MEDICARE

## 2024-11-13 VITALS
HEART RATE: 69 BPM | WEIGHT: 140.4 LBS | SYSTOLIC BLOOD PRESSURE: 124 MMHG | HEIGHT: 59 IN | BODY MASS INDEX: 28.3 KG/M2 | OXYGEN SATURATION: 96 % | DIASTOLIC BLOOD PRESSURE: 72 MMHG

## 2024-11-13 DIAGNOSIS — Z95.0 S/P PLACEMENT OF CARDIAC PACEMAKER: ICD-10-CM

## 2024-11-13 DIAGNOSIS — I36.1 NONRHEUMATIC TRICUSPID VALVE REGURGITATION: ICD-10-CM

## 2024-11-13 DIAGNOSIS — I10 PRIMARY HYPERTENSION: ICD-10-CM

## 2024-11-13 DIAGNOSIS — R68.84 JAW PAIN: Primary | ICD-10-CM

## 2024-11-13 DIAGNOSIS — I49.5 TACHYCARDIA-BRADYCARDIA SYNDROME: ICD-10-CM

## 2024-11-13 DIAGNOSIS — I27.20 PULMONARY HYPERTENSION: ICD-10-CM

## 2024-11-13 DIAGNOSIS — E87.1 CHRONIC HYPONATREMIA: ICD-10-CM

## 2024-11-13 DIAGNOSIS — E78.2 MIXED HYPERLIPIDEMIA: ICD-10-CM

## 2024-11-13 DIAGNOSIS — R07.89 CHEST TIGHTNESS: ICD-10-CM

## 2024-11-13 DIAGNOSIS — I48.0 PAROXYSMAL ATRIAL FIBRILLATION: ICD-10-CM

## 2024-11-13 DIAGNOSIS — I25.10 CORONARY ARTERY DISEASE INVOLVING NATIVE CORONARY ARTERY OF NATIVE HEART, UNSPECIFIED WHETHER ANGINA PRESENT: ICD-10-CM

## 2024-11-13 NOTE — PROGRESS NOTES
Date of Office Visit: 2024  Encounter Provider: KELVIN Chicas  Place of Service: [facility] CARDIOLOGY  Patient Name: Brina Navarrete  :1935  Primary Cardiologist: Dr. Anderson Cordova    Chief Complaint   Patient presents with    Coronary Artery Disease    Hospital Follow Up Visit   :     HPI: Brina Navarrete is a 89 y.o. female who presents today for follow-up after recent heart catheterization.  I have reviewed her medical records.    She has known hypertension, hyperlipidemia, varicose veins, and chronic hyponatremia.     In , she was diagnosed with coronary artery disease with a 60% stenosis in the mid-distal LAD with medical treatment recommended.  She has been followed by cardiology and electrophysiology in [facility].     In , she was diagnosed with atrial flutter and underwent successful ablation.  She then developed atrial fibrillation and tachybradycardia syndrome and underwent implantation of dual-chamber MDT pacemaker.  She continues to follow with her electrophysiologist in [facility] until she has her generator change in the future.     In 2024, her pacemaker download showed a 15-hour episode of atrial fibrillation and her EP doctor increased her metoprolol.      Since 2024, she has reported intermittent jaw discomfort and chest tightness.  Echocardiogram showed the following: Normal LVEF, severe tricuspid regurgitation, and moderate pulmonary hypertension.  Myocardial perfusion study was normal.  She was tried on isosorbide.  She then reported anxiety possibly contributing to her symptoms and was started on Zoloft by her PCP.    Due to persistent symptoms, coronary angiography was arranged.  This showed left main normal, 30% diffuse midportion LAD disease, moderate size second diagonal 90% origin lesion not amenable to PCI, circumflex normal, and RCA 10% mid stenosis.  She had normal LVEDP.  Medical treatment recommended and  isosorbide was increased to 60 mg daily.    She presents today for a follow-up visit with her daughter accompanying her.  I reviewed the heart cath results with them.  Her right arm is quite bruised sore.  She has had 1 episode of fleeting chest pain.  She also had another episode of jaw pain for a few seconds.  She says she has been checked out by her dentist and her teeth and jaw is okay.  She had an episode of right arm pain that was sore to touch.  She denies shortness of breath, palpitations, edema, dizziness, or syncope.  She is tolerating the isosorbide.  We discussed possible sleep apnea and she said she is so claustrophobic that she would never be able to use CPAP.      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    Severe per echo 8/2024      Paroxysmal atrial fibrillation     Pulmonary hypertension 08/22/2024    Moderate per echo 8/2024      Tachycardia-bradycardia syndrome     s/p MDT dual chamber PPM 2015    Varicose veins of both lower extremities        Past Surgical History:   Procedure Laterality Date    ABLATION OF DYSRHYTHMIC FOCUS  8/24/2015    Procedure to correct abnormal heart rhythm    CARDIAC CATHETERIZATION  05/21/14    CARDIAC CATHETERIZATION N/A 11/7/2024    Procedure: Left Heart Cath;  Surgeon: Danny Rose MD;  Location: [facility] INVASIVE LOCATION;  Service: Cardiovascular;  Laterality: N/A;    CARDIAC CATHETERIZATION N/A 11/7/2024    Procedure: Left ventriculography;  Surgeon: Danny Rose MD;  Location: [facility] CATH INVASIVE LOCATION;  Service: Cardiovascular;  Laterality: N/A;    CARDIAC CATHETERIZATION N/A 11/7/2024    Procedure: Coronary angiography;  Surgeon: Danny Rose MD;  Location: [facility]  INVASIVE LOCATION;  Service: Cardiovascular;  Laterality: N/A;    INSERT / REPLACE / REMOVE PACEMAKER  4/20/2015    Insertion of permanent pacemaker upper and lower chamber    PACEMAKER IMPLANTATION      [employer]TRONIC       Social History     Socioeconomic History    Marital status:     Number of children: 2   Tobacco Use    Smoking status: Former     Current packs/day: 0.50     Average packs/day: 0.5 packs/day for 25.0 years (12.5 ttl pk-yrs)     Types: Cigarettes    Smokeless tobacco: Never    Tobacco comments:     Social smoker only   Vaping Use    Vaping status: Never Used   Substance and Sexual Activity    Alcohol use: Yes     Comment: 2 or 3 glasses of wine weekly    Drug use: Never    Sexual activity: Defer     Partners: Male     Birth control/protection: Pill, Diaphragm     Comment: Marriage only       Family History   Problem Relation Age of Onset    Arrhythmia Father         Age  78, a heart attack , recovered    Heart attack Father     Arrhythmia Brother         Ar rhythmic in 50’s,  stroke at  73       The following portion of the patient's history were reviewed and updated as appropriate: past medical history, past surgical history, past social history, past family history, allergies, current medications, and problem list.    Review of Systems   Constitutional: Negative.   Cardiovascular:  Positive for chest pain.   Respiratory: Negative.     Hematologic/Lymphatic: Bruises/bleeds easily.   Neurological: Negative.        Allergies   Allergen Reactions    Ace Inhibitors Cough and Other (See Comments)     Causes cough         Current Outpatient Medications:     acetaminophen (TYLENOL) 500 MG tablet, Take 1 tablet by mouth Every 6 (Six) Hours As Needed for Mild Pain., Disp: , Rfl:     atorvastatin (LIPITOR) 20 MG tablet, Daily., Disp: , Rfl:     Eliquis 5 MG tablet tablet, Every 12 (Twelve) Hours., Disp: , Rfl:     isosorbide mononitrate (IMDUR) 60 MG 24 hr tablet, Take 1 tablet by mouth Daily., Disp:  "90 tablet, Rfl: 3    Loratadine (CLARITIN PO), Take 1 tablet by mouth Daily., Disp: , Rfl:     metoprolol succinate XL (TOPROL-XL) 50 MG 24 hr tablet, Take 1 tablet by mouth 2 (Two) Times a Day., Disp: , Rfl:     multivitamin with minerals (Centrum Silver 50+Women) tablet tablet, Take 1 tablet by mouth Daily., Disp: , Rfl:     nitroglycerin (NITROSTAT) 0.4 MG SL tablet, 1 under the tongue as needed for angina, may repeat q5mins for up three doses, Disp: 30 tablet, Rfl: 1    sertraline (ZOLOFT) 25 MG tablet, Take 1 tablet by mouth Daily., Disp: , Rfl:     vitamin D3 125 MCG (5000 UT) capsule capsule, Take 1 capsule by mouth Daily. OTC, Disp: , Rfl:          Objective:     Vitals:    11/13/24 1503   BP: 124/72   BP Location: Left arm   Patient Position: Sitting   Cuff Size: Adult   Pulse: 69   SpO2: 96%   Weight: 63.7 kg (140 lb 6.4 oz)   Height: 151.1 cm (59.49\")     Body mass index is 27.89 kg/m².    PHYSICAL EXAM:    Vitals Reviewed.   General Appearance: No acute distress, well developed and well nourished.   HENT: No hearing loss noted.    Respiratory: No signs of respiratory distress. Respiration rhythm and depth normal.  Clear to auscultation.   Cardiovascular:  Jugular Venous Pressure: Normal  Heart Rate and Rhythm: Normal, Heart Sounds: Normal S1 and S2. No S3 or S4 noted.  Murmurs: No murmurs noted. No rubs, thrills, or gallops.   Lower Extremities: No edema noted.  Right wrist from cath: Very bruised.  Sore to touch.  Musculoskeletal: Normal movement of extremities.  Skin: General appearance normal.    Psychiatric: Patient alert and oriented to person, place, and time. Speech and behavior appropriate. Normal mood and affect.       ECG 12 Lead    Date/Time: 11/13/2024 3:00 PM  Performed by: Elisabeth Fischer APRN    Authorized by: Elisabeth Fischer APRN  Comparison: compared with previous ECG from 10/28/2024  Similar to previous ECG  Rhythm: paced  Rate: normal  BPM: 69  Conduction: conduction normal  ST " Segments: ST segments normal  T Waves: T waves normal  QRS axis: normal  Pacing: atrial sensed rhythm and 100% capture  Clinical impression: abnormal EKG            Assessment:       Diagnosis Plan   1. Jaw pain        2. Chest tightness        3. Coronary artery disease involving native coronary artery of native heart, unspecified whether angina present        4. Nonrheumatic tricuspid valve regurgitation        5. Pulmonary hypertension        6. Tachycardia-bradycardia syndrome        7. S/P placement of cardiac pacemaker        8. Paroxysmal atrial fibrillation        9. Primary hypertension        10. Mixed hyperlipidemia        11. Chronic hyponatremia               Plan:       1/2.  Jaw pain and chest tightness: She has had 1 episode of each since her heart catheterization.  Continue isosorbide 60 mg daily.    3.  Nonocclusive coronary atherosclerosis noted per recent cath.  Nonobstructive LAD and RCA disease.  90% second diagonal lesion not amenable to PCI.  I am not sure if her jaw pain or chest pain or anginal symptoms or if something else is going on.  I recommend follow-up with her PCP as well.  Continue isosorbide, atorvastatin, and metoprolol.    4/5.  Severe tricuspid regurgitation and moderate pulmonary hypertension noted on last echocardiogram.  Denies shortness of breath.  We discussed that this could be a sign of untreated sleep apnea.  She does not want a sleep apnea evaluation because she does not feel like she could use a CPAP because of severe claustrophobia.    6.  History of tachybradycardia syndrome; status post pacemaker placement.  Follows with her EP in [facility] and wants to have her generator changed out in the future there.    7.  History of atrial flutter: Status post ablation in 2015.    8.  Paroxysmal Atrial fibrillation: Diagnosed in 2015.  [id number] or 5.  Continue metoprolol and apixaban.    9.  Hypertension: Blood pressure normal.    10.  Hyperlipidemia: Remains on  atorvastatin.    11.  Chronic hyponatremia.    12.  She will follow-up with Dr. Codrova in February or March 2025.  Call with cardiac concerns before then.    As always, it has been a pleasure to participate in your patient's care. Thank you.         Sincerely,         KELVIN Kemp  [facility] Cardiology      Dictated utilizing Dragon Dictation

## 2024-11-21 ENCOUNTER — TELEPHONE (OUTPATIENT)
Dept: CARDIOLOGY | Facility: CLINIC | Age: 89
End: 2024-11-21
Payer: MEDICARE

## 2024-11-21 NOTE — TELEPHONE ENCOUNTER
Pt says the symptoms are new since the heart cath.  It's localized to her RUE.  Pt says it mainly happens at night.  It feels like a strain, especially when she's pulling blankets up over her.  She says it's an achy and dull pain.  Sometimes her wrist feels achy, but it's mostly in the area from her elbow to the shoulder.  She says if she pushes on the muscle, it's very sore.  It feels similar to the arthritic pain she feels in her knees.  Pt had a hematoma at the site so she's been very cautious with this arm since the procedure.  Pt said it improves with elevation.  She took APAP last night and applied Voltaren, which helped.        Do you have any recommendations for this patient?    Thank you,    Tana RAY RN  [facility] [place]  11/21/24  11:06 EST

## 2024-11-21 NOTE — TELEPHONE ENCOUNTER
Dr. Rose wants her to come in the AM.  She will be put on my schedule and he will see her quickly with me.  She is going to try to get here around 10 AM.  Patient verbalized understanding.

## 2024-11-21 NOTE — TELEPHONE ENCOUNTER
Please triage this message.  Is this both arms/shoulders or just the arm that the heart cath was performed in?    If its both arms/shoulders, I would recommend that she follow-up with her PCP.

## 2024-11-21 NOTE — TELEPHONE ENCOUNTER
Dr. Marino recently saw her in the office last week.  Her right wrist was very bruised and sore to touch.  Normal color and pulses.    Patient has had pain in the arm ever since.  Please see note below.  Do you want a venous ultrasound?

## 2024-11-22 ENCOUNTER — OFFICE VISIT (OUTPATIENT)
Dept: CARDIOLOGY | Facility: CLINIC | Age: 89
End: 2024-11-22
Payer: MEDICARE

## 2024-11-22 DIAGNOSIS — M79.601 RIGHT ARM PAIN: Primary | ICD-10-CM

## 2024-11-22 RX ORDER — METHYLPREDNISOLONE 4 MG/1
TABLET ORAL
Qty: 21 TABLET | Refills: 0 | Status: SHIPPED | OUTPATIENT
Start: 2024-11-22

## 2024-11-22 NOTE — PROGRESS NOTES
Dr. Rose and I saw patient today for right arm pain.  Since her heart catheterization, she reports a throbbing right upper arm discomfort.  The pain is keeping her up at nighttime.    I saw her last week as well.  Right wrist is bruised, but has improved from a week ago.  There is no knot present on right arm.  She has full range of motion.  Arterial pulses intact.    Dr. Rose recommended a Medrol Dosepak for the pain.  Prescription sent to [facility] per her request.  I will check on her next week.    This was not a charged visit and just a courtesy check.

## 2024-11-26 ENCOUNTER — TELEPHONE (OUTPATIENT)
Dept: CARDIOLOGY | Facility: CLINIC | Age: 89
End: 2024-11-26
Payer: MEDICARE

## 2024-11-26 NOTE — TELEPHONE ENCOUNTER
Please call patient.  I recently saw her for right arm pain post cath.  Dr. Rose recommended a Medrol Dosepak.  Is to her arm pain better or resolved?  Thank you

## 2024-11-27 NOTE — TELEPHONE ENCOUNTER
Called and left VM, will continue to try to reach pt.    HUB- please put patient straight through to triage    Maria Victoria Laguna, RN  Triage RN  11/27/24 12:27 EST

## 2024-11-27 NOTE — TELEPHONE ENCOUNTER
Notified patient of recommendations. Patient verbalized understanding.    Caroline Myrick RN  [place] [facility] [place]

## 2024-11-27 NOTE — TELEPHONE ENCOUNTER
I spoke with pt.  She states that she has 2 days left in the medrol dosepak, and that her arm is much better.  She denies any pain at this time.    Maria Victoria Laguna, RN  Triage RN  11/27/24 09:53 EST

## 2024-12-01 ENCOUNTER — HOSPITAL ENCOUNTER (OUTPATIENT)
Facility: HOSPITAL | Age: 89
Discharge: HOME | End: 2024-12-01
Attending: STUDENT IN AN ORGANIZED HEALTH CARE EDUCATION/TRAINING PROGRAM | Admitting: STUDENT IN AN ORGANIZED HEALTH CARE EDUCATION/TRAINING PROGRAM
Payer: MEDICARE

## 2024-12-01 ENCOUNTER — APPOINTMENT (OUTPATIENT)
Dept: GENERAL RADIOLOGY | Facility: HOSPITAL | Age: 89
End: 2024-12-01
Payer: MEDICARE

## 2024-12-01 VITALS
HEART RATE: 63 BPM | OXYGEN SATURATION: 99 % | TEMPERATURE: 97.7 F | WEIGHT: 135 LBS | BODY MASS INDEX: 27.21 KG/M2 | DIASTOLIC BLOOD PRESSURE: 80 MMHG | SYSTOLIC BLOOD PRESSURE: 150 MMHG | RESPIRATION RATE: 17 BRPM | HEIGHT: 59 IN

## 2024-12-01 DIAGNOSIS — R68.84 JAW PAIN: ICD-10-CM

## 2024-12-01 DIAGNOSIS — R07.89 CHEST DISCOMFORT: Primary | ICD-10-CM

## 2024-12-01 DIAGNOSIS — E87.1 HYPONATREMIA: ICD-10-CM

## 2024-12-01 PROBLEM — R07.9 CHEST PAIN: Status: ACTIVE | Noted: 2024-12-01

## 2024-12-01 PROBLEM — R07.9 CHEST PAIN: Status: RESOLVED | Noted: 2024-12-01 | Resolved: 2024-12-01

## 2024-12-01 LAB
ALBUMIN SERPL-MCNC: 4.1 G/DL (ref 3.5–5.2)
ALBUMIN/GLOB SERPL: 1.9 G/DL
ALP SERPL-CCNC: 110 U/L (ref 39–117)
ALT SERPL W P-5'-P-CCNC: 19 U/L (ref 1–33)
ANION GAP SERPL CALCULATED.3IONS-SCNC: 8.7 MMOL/L (ref 5–15)
AST SERPL-CCNC: 17 U/L (ref 1–32)
BASOPHILS # BLD AUTO: 0.05 10*3/MM3 (ref 0–0.2)
BASOPHILS NFR BLD AUTO: 0.8 % (ref 0–1.5)
BILIRUB SERPL-MCNC: 0.5 MG/DL (ref 0–1.2)
BUN SERPL-MCNC: 13 MG/DL (ref 8–23)
BUN/CREAT SERPL: 14.9 (ref 7–25)
CALCIUM SPEC-SCNC: 9.4 MG/DL (ref 8.6–10.5)
CHLORIDE SERPL-SCNC: 95 MMOL/L (ref 98–107)
CO2 SERPL-SCNC: 25.3 MMOL/L (ref 22–29)
CREAT SERPL-MCNC: 0.87 MG/DL (ref 0.57–1)
DEPRECATED RDW RBC AUTO: 44 FL (ref 37–54)
EGFRCR SERPLBLD CKD-EPI 2021: 64.6 ML/MIN/1.73
EOSINOPHIL # BLD AUTO: 0.12 10*3/MM3 (ref 0–0.4)
EOSINOPHIL NFR BLD AUTO: 2 % (ref 0.3–6.2)
ERYTHROCYTE [DISTWIDTH] IN BLOOD BY AUTOMATED COUNT: 12.7 % (ref 12.3–15.4)
GEN 5 2HR TROPONIN T REFLEX: 10 NG/L
GLOBULIN UR ELPH-MCNC: 2.2 GM/DL
GLUCOSE SERPL-MCNC: 123 MG/DL (ref 65–99)
HCT VFR BLD AUTO: 41.8 % (ref 34–46.6)
HGB BLD-MCNC: 14 G/DL (ref 12–15.9)
HOLD SPECIMEN: NORMAL
HOLD SPECIMEN: NORMAL
IMM GRANULOCYTES # BLD AUTO: 0.03 10*3/MM3 (ref 0–0.05)
IMM GRANULOCYTES NFR BLD AUTO: 0.5 % (ref 0–0.5)
INR PPP: 1.1
INR PPP: 1.3 (ref 0.9–1.1)
LYMPHOCYTES # BLD AUTO: 2.14 10*3/MM3 (ref 0.7–3.1)
LYMPHOCYTES NFR BLD AUTO: 35.5 % (ref 19.6–45.3)
MCH RBC QN AUTO: 31.1 PG (ref 26.6–33)
MCHC RBC AUTO-ENTMCNC: 33.5 G/DL (ref 31.5–35.7)
MCV RBC AUTO: 92.9 FL (ref 79–97)
MONOCYTES # BLD AUTO: 0.84 10*3/MM3 (ref 0.1–0.9)
MONOCYTES NFR BLD AUTO: 14 % (ref 5–12)
NEUTROPHILS NFR BLD AUTO: 2.84 10*3/MM3 (ref 1.7–7)
NEUTROPHILS NFR BLD AUTO: 47.2 % (ref 42.7–76)
NT-PROBNP SERPL-MCNC: 1625 PG/ML (ref 0–1800)
PLATELET # BLD AUTO: 251 10*3/MM3 (ref 140–450)
PMV BLD AUTO: 9.1 FL (ref 6–12)
POTASSIUM SERPL-SCNC: 4.3 MMOL/L (ref 3.5–5.2)
PROT SERPL-MCNC: 6.3 G/DL (ref 6–8.5)
PROTHROMBIN TIME: 13.4 SECONDS (ref 11–15)
PROTHROMBIN TIME: 16.4 SECONDS (ref 11.7–14.2)
QT INTERVAL: 401 MS
QTC INTERVAL: 469 MS
RBC # BLD AUTO: 4.5 10*6/MM3 (ref 3.77–5.28)
SODIUM SERPL-SCNC: 129 MMOL/L (ref 136–145)
TROPONIN T DELTA: -1 NG/L
TROPONIN T SERPL HS-MCNC: 11 NG/L
WBC NRBC COR # BLD AUTO: 6.02 10*3/MM3 (ref 3.4–10.8)
WHOLE BLOOD HOLD COAG: NORMAL
WHOLE BLOOD HOLD SPECIMEN: NORMAL

## 2024-12-01 PROCEDURE — 80053 COMPREHEN METABOLIC PANEL: CPT | Performed by: STUDENT IN AN ORGANIZED HEALTH CARE EDUCATION/TRAINING PROGRAM

## 2024-12-01 PROCEDURE — 85025 COMPLETE CBC W/AUTO DIFF WBC: CPT | Performed by: STUDENT IN AN ORGANIZED HEALTH CARE EDUCATION/TRAINING PROGRAM

## 2024-12-01 PROCEDURE — 93010 ELECTROCARDIOGRAM REPORT: CPT | Performed by: INTERNAL MEDICINE

## 2024-12-01 PROCEDURE — G0378 HOSPITAL OBSERVATION PER HR: HCPCS

## 2024-12-01 PROCEDURE — 83880 ASSAY OF NATRIURETIC PEPTIDE: CPT | Performed by: STUDENT IN AN ORGANIZED HEALTH CARE EDUCATION/TRAINING PROGRAM

## 2024-12-01 PROCEDURE — 36415 COLL VENOUS BLD VENIPUNCTURE: CPT

## 2024-12-01 PROCEDURE — 84484 ASSAY OF TROPONIN QUANT: CPT | Performed by: STUDENT IN AN ORGANIZED HEALTH CARE EDUCATION/TRAINING PROGRAM

## 2024-12-01 PROCEDURE — 93005 ELECTROCARDIOGRAM TRACING: CPT | Performed by: STUDENT IN AN ORGANIZED HEALTH CARE EDUCATION/TRAINING PROGRAM

## 2024-12-01 PROCEDURE — 99285 EMERGENCY DEPT VISIT HI MDM: CPT

## 2024-12-01 PROCEDURE — 85610 PROTHROMBIN TIME: CPT | Performed by: STUDENT IN AN ORGANIZED HEALTH CARE EDUCATION/TRAINING PROGRAM

## 2024-12-01 PROCEDURE — 85610 PROTHROMBIN TIME: CPT

## 2024-12-01 PROCEDURE — 71045 X-RAY EXAM CHEST 1 VIEW: CPT

## 2024-12-01 PROCEDURE — 99214 OFFICE O/P EST MOD 30 MIN: CPT | Performed by: INTERNAL MEDICINE

## 2024-12-01 RX ORDER — SODIUM CHLORIDE 0.9 % (FLUSH) 0.9 %
10 SYRINGE (ML) INJECTION AS NEEDED
Status: DISCONTINUED | OUTPATIENT
Start: 2024-12-01 | End: 2024-12-01 | Stop reason: HOSPADM

## 2024-12-01 RX ORDER — OMEPRAZOLE 40 MG/1
40 CAPSULE, DELAYED RELEASE ORAL DAILY
Qty: 30 CAPSULE | Refills: 0 | Status: SHIPPED | OUTPATIENT
Start: 2024-12-01 | End: 2024-12-31

## 2024-12-01 RX ORDER — SODIUM CHLORIDE 9 MG/ML
40 INJECTION, SOLUTION INTRAVENOUS AS NEEDED
Status: DISCONTINUED | OUTPATIENT
Start: 2024-12-01 | End: 2024-12-01 | Stop reason: HOSPADM

## 2024-12-01 RX ORDER — ASPIRIN 325 MG
325 TABLET ORAL ONCE
Status: DISCONTINUED | OUTPATIENT
Start: 2024-12-01 | End: 2024-12-01 | Stop reason: HOSPADM

## 2024-12-01 RX ORDER — SODIUM CHLORIDE 0.9 % (FLUSH) 0.9 %
10 SYRINGE (ML) INJECTION EVERY 12 HOURS SCHEDULED
Status: DISCONTINUED | OUTPATIENT
Start: 2024-12-01 | End: 2024-12-01 | Stop reason: HOSPADM

## 2024-12-01 NOTE — CONSULTS
Date of Hospital Visit: 24  Encounter Provider: Danny Rose MD  Place of Service: [facility] CARDIOLOGY  Patient Name: Brina Navarrete  :1935  [id number]  Referral Provider: Yon Styles MD    Chief complaint: Jaw discomfort    History of Present Illness: 89-year-old woman recently had a heart cath 3 weeks ago that was essentially normal except for his is lesion and a small first diagonal.  She got up last night to go to the restroom and afterwards noticed that she started having jaw discomfort no real chest pain not short of breath with it no nausea no vomiting she took a total of 2 nitro it finally went away she went back to sleep and when she woke up this morning felt like she should maybe go get it checked out she went to the [facility] ER and then has been put over here at the unit.  She has never had any difficulty while she is up moving around or during the day.  She does not really say she has a lot of reflux symptoms although her son-in-law says occasionally she has some stomach problems no nausea no vomiting no hematemesis or melena.      Past Medical History:   Diagnosis Date    Atrial flutter     s/p ablation     Hyperlipidemia     Hypertension 2015    Hyponatremia     Nonocclusive coronary atherosclerosis of native coronary artery     2015: 60% mid-distal LAD, treated medically    Nonrheumatic tricuspid valve regurgitation 2024    Severe per echo 2024      Paroxysmal atrial fibrillation     Pulmonary hypertension 2024    Moderate per echo 2024      Tachycardia-bradycardia syndrome     s/p MDT dual chamber PPM     Varicose veins of both lower extremities        Past Surgical History:   Procedure Laterality Date    ABLATION OF DYSRHYTHMIC FOCUS  2015    Procedure to correct abnormal heart rhythm    CARDIAC CATHETERIZATION  14    CARDIAC CATHETERIZATION N/A 2024    Procedure: Left Heart Cath;  Surgeon: Milton  MD Danny;  Location: [facility] CATH INVASIVE LOCATION;  Service: Cardiovascular;  Laterality: N/A;    CARDIAC CATHETERIZATION N/A 11/7/2024    Procedure: Left ventriculography;  Surgeon: Danny Rose MD;  Location: [facility] CATH INVASIVE LOCATION;  Service: Cardiovascular;  Laterality: N/A;    CARDIAC CATHETERIZATION N/A 11/7/2024    Procedure: Coronary angiography;  Surgeon: Danny Rose MD;  Location: [facility] CATH INVASIVE LOCATION;  Service: Cardiovascular;  Laterality: N/A;    INSERT / REPLACE / REMOVE PACEMAKER  4/20/2015    Insertion of permanent pacemaker upper and lower chamber    PACEMAKER IMPLANTATION      MEDTRONIC       Medications Prior to Admission   Medication Sig Dispense Refill Last Dose/Taking    acetaminophen (TYLENOL) 500 MG tablet Take 1 tablet by mouth Every 6 (Six) Hours As Needed for Mild Pain.   Past Week    atorvastatin (LIPITOR) 20 MG tablet Daily.   11/30/2024    Eliquis 5 MG tablet tablet Every 12 (Twelve) Hours.   12/1/2024    isosorbide mononitrate (IMDUR) 60 MG 24 hr tablet Take 1 tablet by mouth Daily. 90 tablet 3 12/1/2024    Loratadine (CLARITIN PO) Take 1 tablet by mouth Daily.   11/30/2024    methylPREDNISolone (MEDROL) 4 MG dose pack Take as directed on package instructions. 21 tablet 0 Past Week    metoprolol succinate XL (TOPROL-XL) 50 MG 24 hr tablet Take 1 tablet by mouth 2 (Two) Times a Day.   12/1/2024    multivitamin with minerals (Centrum Silver 50+Women) tablet tablet Take 1 tablet by mouth Daily.   11/30/2024    nitroglycerin (NITROSTAT) 0.4 MG SL tablet 1 under the tongue as needed for angina, may repeat q5mins for up three doses 30 tablet 1 12/1/2024    sertraline (ZOLOFT) 25 MG tablet Take 1 tablet by mouth Daily.   11/30/2024    vitamin D3 125 MCG (5000 UT) capsule capsule Take 1 capsule by mouth Daily. OTC   11/30/2024       Current Meds  Scheduled Meds:aspirin, 325 mg, Oral, Once  sodium chloride, 10 mL, Intravenous, Q12H      Continuous Infusions:   PRN  Meds:.  sodium chloride    sodium chloride    sodium chloride    Allergies as of 12/01/2024 - Reviewed 12/01/2024   Allergen Reaction Noted    Ace inhibitors Cough and Other (See Comments) 05/14/2015       Social History     Socioeconomic History    Marital status:     Number of children: 2   Tobacco Use    Smoking status: Former     Current packs/day: 0.50     Average packs/day: 0.5 packs/day for 25.0 years (12.5 ttl pk-yrs)     Types: Cigarettes    Smokeless tobacco: Never    Tobacco comments:     Social smoker only   Vaping Use    Vaping status: Never Used   Substance and Sexual Activity    Alcohol use: Yes     Comment: 2 or 3 glasses of wine weekly    Drug use: Never    Sexual activity: Defer     Partners: Male     Birth control/protection: Pill, Diaphragm     Comment: Marriage only       Family History   Problem Relation Age of Onset    Arrhythmia Father         Age  78, a heart attack , recovered    Heart attack Father     Arrhythmia Brother         Ar rhythmic in 50’s,  stroke at  73       REVIEW OF SYSTEMS:   ROS was performed and is negative except as outlined in HPI     REVIEW OF SYSTEMS:   CONSTITUTIONAL: No weight loss, fever, chills, weakness or fatigue.   HEENT: Eyes: No visual loss, blurred vision, double vision or yellow sclerae. Ears, Nose, Throat: No hearing loss, sneezing, congestion, runny nose or sore throat.   SKIN: No rash or itching.     RESPIRATORY: No shortness of breath, hemoptysis, cough or sputum.   GASTROINTESTINAL: No anorexia, nausea, vomiting or diarrhea. No abdominal pain, bright red blood per rectum or melena.  GENITOURINARY: No burning on urination, hematuria or increased frequency.  NEUROLOGICAL: No headache, dizziness, syncope, paralysis, ataxia, numbness or tingling in the extremities. No change in bowel or bladder control.   MUSCULOSKELETAL: No muscle, back pain, joint pain or stiffness.   HEMATOLOGIC: No anemia, bleeding or bruising.   LYMPHATICS: No enlarged nodes.  "No history of splenectomy.   PSYCHIATRIC: No history of depression, anxiety, hallucinations.   ENDOCRINOLOGIC: No reports of sweating, cold or heat intolerance. No polyuria or polydipsia.       Objective:   Temp:  [97.7 °F (36.5 °C)-98.7 °F (37.1 °C)] 97.7 °F (36.5 °C)  Heart Rate:  [60-90] 63  Resp:  [15-17] 17  BP: ()/(62-94) 150/80  Body mass index is 27.27 kg/m².  Flowsheet Rows      Flowsheet Row First Filed Value   Admission Height 149.9 cm (59\") Documented at 12/01/2024 0950   Admission Weight 61.2 kg (135 lb) Documented at 12/01/2024 0950          Vitals:    12/01/24 1300   BP: 150/80   Pulse: 63   Resp: 17   Temp: 97.7 °F (36.5 °C)   SpO2: 99%       Head:    Normocephalic, without obvious abnormality, atraumatic   Eyes:            Lids and lashes normal, conjunctivae and sclerae normal, no   icterus, no pallor   Ears:    Ears appear intact with no abnormalities noted   Throat:   No oral lesions, dentition good   Neck:   No adenopathy, supple, trachea midline, no thyromegaly, no   carotid bruit, no JVD   Lungs:     Breath sounds are equal and clear to auscultation    Heart:    Normal S1 and S2, RRR, No M/G/R   Abdomen:     Normal bowel sounds, no masses, no organomegaly, soft        non-tender, non-distended, no guarding   Extremities:   Moves all extremities well, no edema, no cyanosis, no redness   Pulses:   Pulses palpable and equal bilaterally.    Skin:  Psychiatric:   No bleeding, bruising or rash    Awake, alert and oriented x 3, normal mood and affect             I personally viewed and interpreted the patient's EKG/Telemetry data    Assessment:  Active Hospital Problems   No active problems to display.      Resolved Hospital Problems    Diagnosis Date Resolved POA    **Chest pain [R07.9] 12/01/2024 Yes       Plan: I feel like this is likely can end up being GI in etiology may be some reflux.  Her symptoms only happen at nighttime she recently had to climb 30 stairs without any difficulty.  She " has a very small diagonal that has a small lesion in it and never has any symptoms with exertion so I do not even think that is probably causing a problem.  All of her symptoms always start at nighttime.  Troponins are normal she does have some occasional PVCs that is not a new thing for her.  I would put her on a proton pump inhibitor and let her go home follow-up with her primary care doctor of note it is possible the sertraline is playing a role in her hyponatremia    Danny Rose MD  12/01/24  13:33 EST.

## 2024-12-01 NOTE — FSED PROVIDER NOTE
Physical Therapy Daily Progress Note        Patient: Desi Jean   : 1984  Diagnosis/ICD-10 Code:  Pain, neck [M54.2]  Referring practitioner: Wilver Palomino MD  Date of Initial Visit: Type: THERAPY  Noted: 2021  Today's Date: 10/28/2021  Patient seen for 6 sessions           Patient reports: 2 short lasting headaches since last visit. Reports R neck and L mid back soreness currently.      Objective   See Exercise, Manual, and Modality Logs for complete treatment.       Assessment/Plan    Improved neck tension after manual, but no change in neck pain or mid back pain. Advanced scapular strengthening. She required significant cues to avoid R upper trap compensation, but was able to complete each exercise successfully with minimal pain.          Timed:  Manual Therapy:    10     mins  44846;  Therapeutic Exercise:    35     mins  41358;     Neuromuscular Jessica:   0    mins  79972;    Therapeutic Activity:     0     mins  44205;     Gait Trainin     mins  62142;     Ultrasound:     0     mins  86892;    Electrical Stimulation:    0     mins  28549 ( );    Untimed:  Electrical Stimulation:    0     mins  49500 ( );  Mechanical Traction:    0     mins  43681;     Timed Treatment:   45   mins   Total Treatment:     45   mins    Randy Mckinney PT  Physical Therapist     Subjective   History of Present Illness  See MDM    Review of Systems   Constitutional:  Negative for fever.   HENT:          Jaw pain   Respiratory:  Positive for chest tightness. Negative for shortness of breath.    Cardiovascular:  Positive for chest pain.   Gastrointestinal:  Negative for abdominal pain, nausea and vomiting.   Genitourinary:  Negative for dysuria.   Skin:  Negative for rash and wound.   Neurological:  Negative for weakness and numbness.       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    Severe per echo 8/2024      Paroxysmal atrial fibrillation     Pulmonary hypertension 08/22/2024    Moderate per echo 8/2024      Tachycardia-bradycardia syndrome     s/p MDT dual chamber PPM 2015    Varicose veins of both lower extremities        Allergies   Allergen Reactions    Ace Inhibitors Cough and Other (See Comments)     Causes cough       Past Surgical History:   Procedure Laterality Date    ABLATION OF DYSRHYTHMIC FOCUS  8/24/2015    Procedure to correct abnormal heart rhythm    CARDIAC CATHETERIZATION  05/21/14    CARDIAC CATHETERIZATION N/A 11/7/2024    Procedure: Left Heart Cath;  Surgeon: Danny Rose MD;  Location: [facility] CATH INVASIVE LOCATION;  Service: Cardiovascular;  Laterality: N/A;    CARDIAC CATHETERIZATION N/A 11/7/2024    Procedure: Left ventriculography;  Surgeon: Danny Rose MD;  Location: [facility] CATH INVASIVE LOCATION;  Service: Cardiovascular;  Laterality: N/A;    CARDIAC CATHETERIZATION N/A 11/7/2024    Procedure: Coronary angiography;  Surgeon: Danny Rose MD;  Location: [facility] CATH INVASIVE LOCATION;  Service: Cardiovascular;  Laterality: N/A;    INSERT / REPLACE / REMOVE PACEMAKER  4/20/2015     Insertion of permanent pacemaker upper and lower chamber    PACEMAKER IMPLANTATION      [employer]       Family History   Problem Relation Age of Onset    Arrhythmia Father         Age  78, a heart attack , recovered    Heart attack Father     Arrhythmia Brother         Ar rhythmic in 50’s,  stroke at  73       Social History     Socioeconomic History    Marital status:     Number of children: 2   Tobacco Use    Smoking status: Former     Current packs/day: 0.50     Average packs/day: 0.5 packs/day for 25.0 years (12.5 ttl pk-yrs)     Types: Cigarettes    Smokeless tobacco: Never    Tobacco comments:     Social smoker only   Vaping Use    Vaping status: Never Used   Substance and Sexual Activity    Alcohol use: Yes     Comment: 2 or 3 glasses of wine weekly    Drug use: Never    Sexual activity: Defer     Partners: Male     Birth control/protection: Pill, Diaphragm     Comment: Marriage only           Objective   Physical Exam  Vitals and nursing note reviewed.   Constitutional:       Appearance: Normal appearance.   HENT:      Head: Atraumatic.      Right Ear: External ear normal.      Left Ear: External ear normal.      Nose: Nose normal.      Mouth/Throat:      Pharynx: Oropharynx is clear.   Eyes:      Conjunctiva/sclera: Conjunctivae normal.   Cardiovascular:      Rate and Rhythm: Normal rate. Rhythm irregular.      Pulses: Normal pulses.      Heart sounds: Normal heart sounds.   Pulmonary:      Effort: Pulmonary effort is normal. No respiratory distress.      Breath sounds: Normal breath sounds.      Comments: The patient with clear lung sounds and normal heart sound.  Normal radial pulses bilaterally.  Abdominal:      General: Abdomen is flat. There is no distension.      Palpations: Abdomen is soft.      Tenderness: There is no abdominal tenderness. There is no right CVA tenderness, left CVA tenderness, guarding or rebound.   Musculoskeletal:         General: No swelling.      Cervical back: Neck  supple.      Right lower leg: No edema.      Left lower leg: No edema.   Skin:     General: Skin is warm and dry.   Neurological:      Mental Status: She is alert and oriented to person, place, and time. Mental status is at baseline.   Psychiatric:         Behavior: Behavior normal.         Procedures           ED Course  ED Course as of 12/01/24 1111   Sun Dec 01, 2024   0951 ECG 12 Lead Chest Pain  I reviewed his EKG myself and is no acute abnormal findings.  The heart rate is 82.  Appears to be sinus rhythm.  QTc is 469.  SC interval is 126.  He has a left bundle branch block.  There is no Sgarbossa criteria met. [DL]   0957 CBC & Differential(!)  The patient with normal white blood cell count at 6, less likely to be severe infection or inflammation.  Hemoglobin is normal at 14.  Platelets 251. [DL]   1018 Comprehensive Metabolic Panel(!)  Normal TMs and canals.  CMP with glucose of 123.  Creatinine 0.87.  Sodium is stable at 129.  Potassium is 4.3 with slight hemolysis.  Bicarb is 25.  ALT is 19.  AST 17.  Total bilirubin is 0.5.  Anion gap is 8.7. [DL]   1022 proBNP: 1,625.0  Normal for our cutoff. [DL]   1023 HS Troponin T: 11  The patient with normal high-sensitivity troponin.  Reassuring against active ACS. [DL]   1024 XR Chest 1 View  IMPRESSION:  No focal pulmonary consolidation. Tortuous aorta. Follow-up  as clinically indicated.   [DL]   1057 Spoke with the [facility] at obs unit. Patient admitted under Dr. Armstrong.  [DL]   1108 INR: 1.10  Normal INR. [DL]      ED Course User Index  [DL] Johnnie Fernandez MD                HEART Score: 6                            Medical Decision Making  89-year-old female patient, history of CAD, tricuspid valve regurgitation, pulmonary hypertension, sick sinus syndrome status post pacemaker, paroxysmal A-fib, hypertension, hyperlipidemia, chronic hyponatremia    She came to the emergency room because at 12:00 today, she noticed that she was having some chest discomfort.  It  "feels like \" squeezing\".  She also said that she felt the pain initially in the jaw bilaterally and then the chest squeezing.  She did not have any shortness of breath, nausea, vomiting, diaphoresis at that time.  No lightheadedness.  She has no abdominal pain, nausea, vomiting, diarrhea.  She said that she felt this is similar to previous angina.    Per chart review, the patient did have a stress test, echo and left heart cath.  She was told that she had 90% occlusion of the second diagonal.  However, it was too small so the doctor told her to maximize medical management and no stent needed.    She describes that the pain was squeezing and it last for about 20 minutes.  She took 2 nitros and went away.  This morning she went to the ER at [facility] to see was going on.    On physical exam, the patient is well-appearing.  She is under no acute distress.  She is currently not having any chest pain.  She has clear lung sounds and normal heart sound.  She has irregular heart rate but appears to be sinus.  She has some PVCs on the monitor as well.  Her lungs are clear.  Abdomen is soft and nontender.  No guarding or distention.  She has no leg swelling.  She is awake and alert x 4, conversing appropriately with me.  No back pain.  No CVA tenderness.    Assessment: The patient is 89 years of age, he her heart score is 6 for age, EKG, risk factors and story. I am concerned about ACS, especially with her 90% occlusion of the diagonal branch.   she was started on isosorbide which she tolerated very well.  Per cardiologist, this is to maximize her medical management because she was not a candidate for a stent due to the small size of the artery.    She reports that she takes Eliquis.  She has no history of DVT or PE.  She takes it for A-fib.  I do not think that she has PE, especially with no tachycardia, desaturation or unilateral leg swelling or pleuritic chest pain.  We we will deferred a CT PE at this time to avoid " unnecessary radiation and contrast use in this woman as well as delay care.    She also has no tearing chest pain rating to the back, no radial pulse deficit, no VitaMist diam on the chest x-ray, severe hypertension, so I doubt that she is experiencing aortic dissection.  Similarly, we will defer the CT angio at this time.    Otherwise, there is no evidence of pneumonia, pneumothorax, pleural effusion, Boerhaave based on history, physical exam and chest x-ray.    On the monitor, the patient does have multiple PVCs.  I query whether this is the reason why she was feeling some chest squeezing and chest discomfort.  We do not have capability of interrogating the pacemaker at [facility] so  I would prefer for her to be transferred to the [facility] for observation and interrogation of her [employer].    Workup: CBC, CMP, EKG, lipase, troponin, chest x-ray, PT/INR  Treatment: The patient is currently asymptomatic so we will defer any treatment at this time.    Problems Addressed:  Chest discomfort: complicated acute illness or injury  Hyponatremia: complicated acute illness or injury  Jaw pain: complicated acute illness or injury    Amount and/or Complexity of Data Reviewed  Labs: ordered. Decision-making details documented in ED Course.  Radiology: ordered. Decision-making details documented in ED Course.  ECG/medicine tests: ordered. Decision-making details documented in ED Course.    Risk  OTC drugs.  Prescription drug management.    Updates: The patient is well-appearing.  She is asymptomatic on my while she is here in the ED.  Her lab test are reassuring.  She does have some mild hyponatremia but that appears to be stable.  Otherwise, negative troponin.  We will need to trend it given her heart score.  She was admitted to the observation unit  at [facility] under total Dr. Armstrong.    Please note that portions of this note were completed with a voice recognition program.         I reviewed the Left heart  cath report:     LEFT VENTRICULOGRAPHY: The LV pressure was 95/10.  There was normal to hyperdynamic LV systolic function without segmental wall motion under mildly.  There was no mitral insufficiency or gradient across the aortic valve on pullback.     CORONARY ANGIOGRAPHY:  Left main: Normal  Left anterior descending: Normal proximally diffuse 30% in the midportion normal distally there is a moderate-sized second diagonal with a 90% origin lesion not amenable to PCI  Ramus intermedius:Not present  Circumflex: Normal throughout  RCA: Is a dominant vessel.  Normal approximately 10% mid and normal distally     SUMMARY: Normal LV systolic function without segmental wall motion abnormality.  Normal LVEDP.  There is a lesion in the second diagonal but it is too small to intervene on otherwise mild nonobstructive coronary disease     RECOMMENDATIONS: Medical therapy and risk factor modification    Final diagnoses:   Chest discomfort   Jaw pain   Hyponatremia       ED Disposition  ED Disposition       ED Disposition   Transfer to Another Facility     Condition   --    Comment   --               No follow-up provider specified.       Medication List      No changes were made to your prescriptions during this visit.

## 2024-12-01 NOTE — PLAN OF CARE
Goal Outcome Evaluation:   Patient has been discharged from the unit after meeting with Cardiology. Patient has had IV removed and taken belongings with her. Discharge and follow up has been discussed with a verbal understanding relayed back to the nurse.

## 2024-12-01 NOTE — ED NOTES
ATTEMPTED TO CALL REPORT TO OBS UNIT AND WAS INFORMED THAT ACCEPTING RN STEPPED OFF OF THE FLOOR. CALLBACK EXTENSION GIVEN.

## 2024-12-01 NOTE — H&P
"[facility] [place]   HISTORY AND PHYSICAL    Patient Name: Brina Navarrete  : 1935  MRN: [id number]  Primary Care Physician:  Yon Styles MD  Date of admission: 2024    Subjective   Subjective     Chief Complaint: Chest pain    HPI:    Brina Navarrete is a 89 y.o. female with PMH of atrial flutter, proximal A-fib on Eliquis, CAD, tachybradycardia syndrome s/p pacemaker placement, hypertension, hyperlipidemia and pulmonary hypertension presented to [facility] ED with chest pain.  Patient reports that she got up throughout the night to use the bathroom and around 1230 he developed jaw tightness I was going into her right shoulder that was followed with diffuse chest tightness that patient states it was\" more pronounced\" than her typical episodes of chest discomfort.  Denies associated nausea, vomiting, diaphoresis, shortness of breath or back pain.  States that she took 2 sublingual nitroglycerin and her symptoms resolved and was able to fall back asleep.  Patient was concerned that it was\" my heart\" therefore presented to [facility] ED for further evaluation.  States that she has been asymptomatic throughout the day.  Denies fever or chills.  Denies abdominal pain, nausea, vomiting, or diarrhea.      Per chart review patient had cardiac catheterization on 2024 that showed moderate sized second diagonal with 90% origin lesion within the LAD and recommendation was made to manage with medical therapy.    ED workup reviewed: High-sensitivity troponin 11 and 10 with a delta of -1, BNP 1625, creatinine 0.87, INR 1.10, WBC 6.02, hemoglobin 14 and platelets 251.  Chest x-ray shows no evidence of active disease and EKG shows V paced rhythm with a rate of 82    Review of Systems   All systems were reviewed and negative except for: That mentioned above in HPI    Personal History     Past Medical History:   Diagnosis Date    Atrial flutter     s/p ablation     Hyperlipidemia     Hypertension 2015    " Hyponatremia     Nonocclusive coronary atherosclerosis of native coronary artery     2015: 60% mid-distal LAD, treated medically    Nonrheumatic tricuspid valve regurgitation 08/22/2024    Severe per echo 8/2024      Paroxysmal atrial fibrillation     Pulmonary hypertension 08/22/2024    Moderate per echo 8/2024      Tachycardia-bradycardia syndrome     s/p MDT dual chamber PPM 2015    Varicose veins of both lower extremities        Past Surgical History:   Procedure Laterality Date    ABLATION OF DYSRHYTHMIC FOCUS  8/24/2015    Procedure to correct abnormal heart rhythm    CARDIAC CATHETERIZATION  05/21/14    CARDIAC CATHETERIZATION N/A 11/7/2024    Procedure: Left Heart Cath;  Surgeon: Danny Rose MD;  Location:  [facility] CATH INVASIVE LOCATION;  Service: Cardiovascular;  Laterality: N/A;    CARDIAC CATHETERIZATION N/A 11/7/2024    Procedure: Left ventriculography;  Surgeon: Danny Rose MD;  Location:  [facility] CATH INVASIVE LOCATION;  Service: Cardiovascular;  Laterality: N/A;    CARDIAC CATHETERIZATION N/A 11/7/2024    Procedure: Coronary angiography;  Surgeon: Danny Rose MD;  Location:  [facility] CATH INVASIVE LOCATION;  Service: Cardiovascular;  Laterality: N/A;    INSERT / REPLACE / REMOVE PACEMAKER  4/20/2015    Insertion of permanent pacemaker upper and lower chamber    PACEMAKER IMPLANTATION      [employer]       Family History: family history includes Arrhythmia in her brother and father; Heart attack in her father. Otherwise pertinent FHx was reviewed and not pertinent to current issue.    Social History:  reports that she has quit smoking. Her smoking use included cigarettes. She has a 12.5 pack-year smoking history. She has never used smokeless tobacco. She reports current alcohol use. She reports that she does not use drugs.    Home Medications:  Loratadine, acetaminophen, apixaban, atorvastatin, isosorbide mononitrate, methylPREDNISolone, metoprolol succinate XL, multivitamin with minerals,  nitroglycerin, sertraline, and vitamin D3    Allergies:  Allergies   Allergen Reactions    Ace Inhibitors Cough and Other (See Comments)     Causes cough       Objective   Objective     Vitals:   Temp:  [98.1 °F (36.7 °C)] 98.1 °F (36.7 °C)  Heart Rate:  [75-77] 75  Resp:  [15-17] 17  BP: ()/(74-94) 96/74  Physical Exam    Constitutional: Awake, alert   Eyes: PERRLA, sclerae anicteric, no conjunctival injection   HENT: NCAT, mucous membranes moist   Neck: Supple, no thyromegaly, no lymphadenopathy, trachea midline   Respiratory: Clear to auscultation bilaterally, nonlabored respirations    Cardiovascular: RRR, no murmurs, rubs, or gallops, palpable pedal pulses bilaterally   Gastrointestinal: Positive bowel sounds, soft, nontender, nondistended   Musculoskeletal: No bilateral ankle edema, no clubbing or cyanosis to extremities   Psychiatric: Appropriate affect, cooperative   Neurologic: Oriented x 3, strength symmetric in all extremities, Cranial Nerves grossly intact to confrontation, speech clear   Skin: No rashes     Result Review    Result Review:  I have personally reviewed the results from the time of this admission to 12/1/2024 11:29 EST and agree with these findings:  [x]  Laboratory list / accordion  []  Microbiology  [x]  Radiology  [x]  EKG/Telemetry   []  Cardiology/Vascular   []  Pathology  []  Old records  []  Other:        Assessment & Plan   Assessment / Plan     Brief Patient Summary:  Brina Navarrete is a 89 y.o. female who is being admitted to observation unit secondary to chest pain.  Her workup shows flat serial high-sensitivity troponin and nonischemic EKG.  Dr. Rose with cardiology evaluated patient and believes that symptoms only occurs at night and most likely due to GERD therefore recommends that patient takes PPI and to follow-up outpatient.  Patient will be discharged home.    Active Hospital Problems:  Active Hospital Problems    Diagnosis     **Chest pain      Plan:   CAD  Chest  pain  -High-sensitivity troponin 11 and 10  -EKG shows V paced rhythm with a rate of 82  -Cardiology consult  -Cardiac monitoring  -Echo on 8/21/2024 showed LVEF 68%, left atrium dilated, moderate mitral annular calcification with mild regurgitation and grade 1 diastolic function  -Continue isosorbide and metoprolol    Paroxysmal A-fib  -Continue Eliquis  -Cardiac monitoring    Hyperlipidemia  -Continue statin      VTE Prophylaxis:  Mechanical VTE prophylaxis orders are present.      CODE STATUS:    Level Of Support Discussed With: Patient  Code Status (Patient has no pulse and is not breathing): CPR (Attempt to Resuscitate)  Medical Interventions (Patient has pulse or is breathing): Full Support    Admission Status:  I believe this patient meets observation status.    During patient visit, I utilized appropriate personal protective equipment including gloves. Appropriate PPE was worn during the entire visit.  Hand hygiene was completed before and after    65 minutes has been spent by [facility] providers in the care of this patient while under observation status    Electronically signed by KELVIN Schofield, 12/01/24, 11:29 AM EST.      This note will serve as discharge summary

## 2024-12-01 NOTE — NURSING NOTE
Patient was brought to the unit by EMS from [facility]. During intake patient was met by cardiology and has been discharged.

## 2024-12-31 ENCOUNTER — APPOINTMENT (OUTPATIENT)
Dept: GENERAL RADIOLOGY | Facility: HOSPITAL | Age: 89
End: 2024-12-31
Payer: MEDICARE

## 2024-12-31 ENCOUNTER — HOSPITAL ENCOUNTER (EMERGENCY)
Facility: HOSPITAL | Age: 89
Discharge: HOME | End: 2024-12-31
Attending: EMERGENCY MEDICINE | Admitting: EMERGENCY MEDICINE
Payer: MEDICARE

## 2024-12-31 VITALS
RESPIRATION RATE: 18 BRPM | SYSTOLIC BLOOD PRESSURE: 137 MMHG | BODY MASS INDEX: 27.21 KG/M2 | DIASTOLIC BLOOD PRESSURE: 64 MMHG | WEIGHT: 135 LBS | TEMPERATURE: 98.2 F | OXYGEN SATURATION: 93 % | HEIGHT: 59 IN | HEART RATE: 60 BPM

## 2024-12-31 DIAGNOSIS — J98.01 BRONCHOSPASM: ICD-10-CM

## 2024-12-31 DIAGNOSIS — B33.8 RSV (RESPIRATORY SYNCYTIAL VIRUS INFECTION): Primary | ICD-10-CM

## 2024-12-31 LAB
ALBUMIN SERPL-MCNC: 4.3 G/DL (ref 3.5–5.2)
ALBUMIN/GLOB SERPL: 1.5 G/DL
ALP SERPL-CCNC: 98 U/L (ref 39–117)
ALT SERPL W P-5'-P-CCNC: 22 U/L (ref 1–33)
ANION GAP SERPL CALCULATED.3IONS-SCNC: 10.1 MMOL/L (ref 5–15)
AST SERPL-CCNC: 23 U/L (ref 1–32)
BASOPHILS # BLD AUTO: 0.04 10*3/MM3 (ref 0–0.2)
BASOPHILS NFR BLD AUTO: 0.6 % (ref 0–1.5)
BILIRUB SERPL-MCNC: 0.4 MG/DL (ref 0–1.2)
BUN SERPL-MCNC: 8 MG/DL (ref 8–23)
BUN/CREAT SERPL: 10.5 (ref 7–25)
CALCIUM SPEC-SCNC: 9 MG/DL (ref 8.6–10.5)
CHLORIDE SERPL-SCNC: 90 MMOL/L (ref 98–107)
CO2 SERPL-SCNC: 22.9 MMOL/L (ref 22–29)
CREAT SERPL-MCNC: 0.76 MG/DL (ref 0.57–1)
D-LACTATE SERPL-SCNC: 2.1 MMOL/L (ref 0.5–2)
DEPRECATED RDW RBC AUTO: 43.4 FL (ref 37–54)
EGFRCR SERPLBLD CKD-EPI 2021: 75.9 ML/MIN/1.73
EOSINOPHIL # BLD AUTO: 0 10*3/MM3 (ref 0–0.4)
EOSINOPHIL NFR BLD AUTO: 0 % (ref 0.3–6.2)
ERYTHROCYTE [DISTWIDTH] IN BLOOD BY AUTOMATED COUNT: 12.8 % (ref 12.3–15.4)
FLUAV SUBTYP SPEC NAA+PROBE: NOT DETECTED
FLUBV RNA ISLT QL NAA+PROBE: NOT DETECTED
GEN 5 1HR TROPONIN T REFLEX: <6 NG/L
GLOBULIN UR ELPH-MCNC: 2.8 GM/DL
GLUCOSE SERPL-MCNC: 149 MG/DL (ref 65–99)
HCT VFR BLD AUTO: 39.3 % (ref 34–46.6)
HGB BLD-MCNC: 12.8 G/DL (ref 12–15.9)
IMM GRANULOCYTES # BLD AUTO: 0.01 10*3/MM3 (ref 0–0.05)
IMM GRANULOCYTES NFR BLD AUTO: 0.1 % (ref 0–0.5)
LYMPHOCYTES # BLD AUTO: 0.7 10*3/MM3 (ref 0.7–3.1)
LYMPHOCYTES NFR BLD AUTO: 9.7 % (ref 19.6–45.3)
MAGNESIUM SERPL-MCNC: 1.9 MG/DL (ref 1.6–2.4)
MCH RBC QN AUTO: 29.7 PG (ref 26.6–33)
MCHC RBC AUTO-ENTMCNC: 32.6 G/DL (ref 31.5–35.7)
MCV RBC AUTO: 91.2 FL (ref 79–97)
MONOCYTES # BLD AUTO: 0.34 10*3/MM3 (ref 0.1–0.9)
MONOCYTES NFR BLD AUTO: 4.7 % (ref 5–12)
NEUTROPHILS NFR BLD AUTO: 6.16 10*3/MM3 (ref 1.7–7)
NEUTROPHILS NFR BLD AUTO: 84.9 % (ref 42.7–76)
NT-PROBNP SERPL-MCNC: 1027 PG/ML (ref 0–1800)
PLATELET # BLD AUTO: 225 10*3/MM3 (ref 140–450)
PMV BLD AUTO: 9.2 FL (ref 6–12)
POTASSIUM SERPL-SCNC: 4.1 MMOL/L (ref 3.5–5.2)
PROT SERPL-MCNC: 7.1 G/DL (ref 6–8.5)
RBC # BLD AUTO: 4.31 10*6/MM3 (ref 3.77–5.28)
RSV RNA NPH QL NAA+NON-PROBE: DETECTED
SARS-COV-2 RNA RESP QL NAA+PROBE: NOT DETECTED
SODIUM SERPL-SCNC: 123 MMOL/L (ref 136–145)
TROPONIN T NUMERIC DELTA: NORMAL
TROPONIN T SERPL HS-MCNC: <6 NG/L
WBC NRBC COR # BLD AUTO: 7.25 10*3/MM3 (ref 3.4–10.8)

## 2024-12-31 PROCEDURE — 99284 EMERGENCY DEPT VISIT MOD MDM: CPT | Performed by: EMERGENCY MEDICINE

## 2024-12-31 PROCEDURE — 83880 ASSAY OF NATRIURETIC PEPTIDE: CPT | Performed by: EMERGENCY MEDICINE

## 2024-12-31 PROCEDURE — 93005 ELECTROCARDIOGRAM TRACING: CPT | Performed by: EMERGENCY MEDICINE

## 2024-12-31 PROCEDURE — 96374 THER/PROPH/DIAG INJ IV PUSH: CPT

## 2024-12-31 PROCEDURE — 99284 EMERGENCY DEPT VISIT MOD MDM: CPT

## 2024-12-31 PROCEDURE — 71045 X-RAY EXAM CHEST 1 VIEW: CPT

## 2024-12-31 PROCEDURE — 85025 COMPLETE CBC W/AUTO DIFF WBC: CPT | Performed by: EMERGENCY MEDICINE

## 2024-12-31 PROCEDURE — 80053 COMPREHEN METABOLIC PANEL: CPT | Performed by: EMERGENCY MEDICINE

## 2024-12-31 PROCEDURE — 25010000002 METHYLPREDNISOLONE PER 125 MG: Performed by: EMERGENCY MEDICINE

## 2024-12-31 PROCEDURE — 87637 SARSCOV2&INF A&B&RSV AMP PRB: CPT | Performed by: EMERGENCY MEDICINE

## 2024-12-31 PROCEDURE — 36415 COLL VENOUS BLD VENIPUNCTURE: CPT

## 2024-12-31 PROCEDURE — 83735 ASSAY OF MAGNESIUM: CPT | Performed by: EMERGENCY MEDICINE

## 2024-12-31 PROCEDURE — 84484 ASSAY OF TROPONIN QUANT: CPT | Performed by: EMERGENCY MEDICINE

## 2024-12-31 PROCEDURE — 83605 ASSAY OF LACTIC ACID: CPT | Performed by: EMERGENCY MEDICINE

## 2024-12-31 PROCEDURE — 25810000003 SODIUM CHLORIDE 0.9 % SOLUTION: Performed by: EMERGENCY MEDICINE

## 2024-12-31 RX ORDER — AZITHROMYCIN 250 MG/1
TABLET, FILM COATED ORAL
Qty: 6 TABLET | Refills: 0 | Status: SHIPPED | OUTPATIENT
Start: 2024-12-31

## 2024-12-31 RX ORDER — IPRATROPIUM BROMIDE AND ALBUTEROL SULFATE 2.5; .5 MG/3ML; MG/3ML
3 SOLUTION RESPIRATORY (INHALATION) ONCE
Status: COMPLETED | OUTPATIENT
Start: 2024-12-31 | End: 2024-12-31

## 2024-12-31 RX ORDER — AZITHROMYCIN 250 MG/1
500 TABLET, FILM COATED ORAL ONCE
Status: COMPLETED | OUTPATIENT
Start: 2024-12-31 | End: 2024-12-31

## 2024-12-31 RX ORDER — HYDROCODONE POLISTIREX AND CHLORPHENIRAMINE POLISTIREX 10; 8 MG/5ML; MG/5ML
5 SUSPENSION, EXTENDED RELEASE ORAL EVERY 12 HOURS PRN
Qty: 50 ML | Refills: 0 | Status: SHIPPED | OUTPATIENT
Start: 2024-12-31

## 2024-12-31 RX ORDER — METHYLPREDNISOLONE SODIUM SUCCINATE 125 MG/2ML
125 INJECTION, POWDER, LYOPHILIZED, FOR SOLUTION INTRAMUSCULAR; INTRAVENOUS ONCE
Status: COMPLETED | OUTPATIENT
Start: 2024-12-31 | End: 2024-12-31

## 2024-12-31 RX ORDER — PREDNISONE 20 MG/1
TABLET ORAL
Qty: 12 TABLET | Refills: 0 | Status: SHIPPED | OUTPATIENT
Start: 2024-12-31

## 2024-12-31 RX ADMIN — METHYLPREDNISOLONE SODIUM SUCCINATE 125 MG: 125 INJECTION INTRAMUSCULAR; INTRAVENOUS at 15:34

## 2024-12-31 RX ADMIN — SODIUM CHLORIDE 1000 ML: 9 INJECTION, SOLUTION INTRAVENOUS at 16:09

## 2024-12-31 RX ADMIN — AZITHROMYCIN DIHYDRATE 500 MG: 250 TABLET ORAL at 16:05

## 2024-12-31 RX ADMIN — IPRATROPIUM BROMIDE AND ALBUTEROL SULFATE 3 ML: .5; 3 SOLUTION RESPIRATORY (INHALATION) at 15:34

## 2024-12-31 NOTE — [facility] PROVIDER NOTE
EMERGENCY DEPARTMENT ENCOUNTER    Room Number:  05/05  Date seen:  12/31/2024  Time seen: 14:59 EST  PCP: Yon Styles MD  Historian:     Discussed/obtained information from independent historians:     HPI:    Patient is a 89-year-old female.  She presents now with an approximate 5 to 6-day history of cough and shortness of breath.  No documented fever or chills.  No sick contacts.  [facility] and was placed on Amoxil, prednisone 40 daily, Phenergan DM.    She presents today with no improvement.  She states that when she has a coughing fit at nighttime she becomes quite short of breath.  No chest pain    External (non-ED) record review:        Chronic or social conditions impacting care:    ALLERGIES  Ace inhibitors    PAST MEDICAL HISTORY  Active Ambulatory Problems     Diagnosis Date Noted    Paroxysmal atrial fibrillation 07/27/2023    Tachycardia-bradycardia syndrome 07/27/2023    Nonocclusive coronary atherosclerosis of native coronary artery 07/27/2023    Hypertension 07/27/2023    Hyperlipidemia 07/27/2023    S/P placement of cardiac pacemaker 01/29/2024    Chronic hyponatremia 01/29/2024    Nonrheumatic tricuspid valve regurgitation 08/22/2024    Pulmonary hypertension 08/22/2024    Coronary artery disease involving native coronary artery of native heart with unstable angina pectoris 10/28/2024     Resolved Ambulatory Problems     Diagnosis Date Noted    Chest pain 12/01/2024     Past Medical History:   Diagnosis Date    Atrial flutter     Hyponatremia     Varicose veins of both lower extremities        PAST SURGICAL HISTORY  Past Surgical History:   Procedure Laterality Date    ABLATION OF DYSRHYTHMIC FOCUS  8/24/2015    Procedure to correct abnormal heart rhythm    CARDIAC CATHETERIZATION  05/21/14    CARDIAC CATHETERIZATION N/A 11/7/2024    Procedure: Left Heart Cath;  Surgeon: Danny Rose MD;  Location: [facility] CATH INVASIVE LOCATION;  Service:  Cardiovascular;  Laterality: N/A;    CARDIAC CATHETERIZATION N/A 11/7/2024    Procedure: Left ventriculography;  Surgeon: Danny Rose MD;  Location:  [facility] CATH INVASIVE LOCATION;  Service: Cardiovascular;  Laterality: N/A;    CARDIAC CATHETERIZATION N/A 11/7/2024    Procedure: Coronary angiography;  Surgeon: Danny Rose MD;  Location:  [facility] CATH INVASIVE LOCATION;  Service: Cardiovascular;  Laterality: N/A;    INSERT / REPLACE / REMOVE PACEMAKER  4/20/2015    Insertion of permanent pacemaker upper and lower chamber    PACEMAKER IMPLANTATION      [employer]       FAMILY HISTORY  Family History   Problem Relation Age of Onset    Arrhythmia Father         Age  78, a heart attack , recovered    Heart attack Father     Arrhythmia Brother         Ar rhythmic in 50’s,  stroke at  73       SOCIAL HISTORY  Social History     Socioeconomic History    Marital status:     Number of children: 2   Tobacco Use    Smoking status: Former     Current packs/day: 0.50     Average packs/day: 0.5 packs/day for 25.0 years (12.5 ttl pk-yrs)     Types: Cigarettes    Smokeless tobacco: Never    Tobacco comments:     Social smoker only   Vaping Use    Vaping status: Never Used   Substance and Sexual Activity    Alcohol use: Yes     Comment: 2 or 3 glasses of wine weekly    Drug use: Never    Sexual activity: Defer     Partners: Male     Birth control/protection: Pill, Diaphragm     Comment: Marriage only       REVIEW OF SYSTEMS  Review of Systems    All systems reviewed and negative except for those discussed in HPI.       PHYSICAL EXAM    I have reviewed the triage vital signs and nursing notes.  Vitals:    12/31/24 1630   BP: 137/64   Pulse: 60   Resp: 18   Temp:    SpO2: 93%     Physical Exam    Vital signs: Reviewed in nurses notes    General: Awake alert mild respiratory distress    HEENT: Pupils equal round responsive to light bilaterally.  Nasopharynx is congested.  Oropharynx is mildly erythematous without exudate  or abscess formation    Neck:   Supple without lymphadenopathy    Respiratory:   There are some scattered expiratory wheezes bilaterally.    Cardiovascular: Regular rate, rhythm is regular with many premature beats.  Trace bilateral pretibial edema    Abdomen: Nondistended    Skin:   Warm and dry.  No rashes noted    Neurological examination: Patient is awake alert oriented x4.  Speech is normal.  No facial palsy.  No focal motor or sensory deficits.    LAB RESULTS  Recent Results (from the past 24 hours)   COVID-19 and FLU A/B PCR, 1 HR TAT - Swab, Nasopharynx    Collection Time: 12/31/24  2:49 PM    Specimen: Nasopharynx; Swab   Result Value Ref Range    COVID19 Not Detected Not Detected - Ref. Range    Influenza A PCR Not Detected Not Detected    Influenza B PCR Not Detected Not Detected   RSV PCR - Swab, Nasopharynx    Collection Time: 12/31/24  2:49 PM    Specimen: Nasopharynx; Swab   Result Value Ref Range    RSV, PCR Detected (A) Not Detected   ECG 12 Lead Dyspnea    Collection Time: 12/31/24  2:49 PM   Result Value Ref Range    QT Interval 398 ms    QTC Interval 411 ms   Comprehensive Metabolic Panel    Collection Time: 12/31/24  3:04 PM    Specimen: Blood   Result Value Ref Range    Glucose 149 (H) 65 - 99 mg/dL    BUN 8 8 - 23 mg/dL    Creatinine 0.76 0.57 - 1.00 mg/dL    Sodium 123 (L) 136 - 145 mmol/L    Potassium 4.1 3.5 - 5.2 mmol/L    Chloride 90 (L) 98 - 107 mmol/L    CO2 22.9 22.0 - 29.0 mmol/L    Calcium 9.0 8.6 - 10.5 mg/dL    Total Protein 7.1 6.0 - 8.5 g/dL    Albumin 4.3 3.5 - 5.2 g/dL    ALT (SGPT) 22 1 - 33 U/L    AST (SGOT) 23 1 - 32 U/L    Alkaline Phosphatase 98 39 - 117 U/L    Total Bilirubin 0.4 0.0 - 1.2 mg/dL    Globulin 2.8 gm/dL    A/G Ratio 1.5 g/dL    BUN/Creatinine Ratio 10.5 7.0 - 25.0    Anion Gap 10.1 5.0 - 15.0 mmol/L    eGFR 75.9 >60.0 mL/min/1.73   Magnesium    Collection Time: 12/31/24  3:04 PM    Specimen: Blood   Result Value Ref Range    Magnesium 1.9 1.6 - 2.4 mg/dL    BNP    Collection Time: 12/31/24  3:04 PM    Specimen: Blood   Result Value Ref Range    proBNP 1,027.0 0.0 - 1,800.0 pg/mL   High Sensitivity Troponin T    Collection Time: 12/31/24  3:04 PM    Specimen: Blood   Result Value Ref Range    HS Troponin T <6 <14 ng/L   Lactic Acid, Plasma    Collection Time: 12/31/24  3:04 PM    Specimen: Blood   Result Value Ref Range    Lactate 2.1 (C) 0.5 - 2.0 mmol/L   CBC Auto Differential    Collection Time: 12/31/24  3:04 PM    Specimen: Blood   Result Value Ref Range    WBC 7.25 3.40 - 10.80 10*3/mm3    RBC 4.31 3.77 - 5.28 10*6/mm3    Hemoglobin 12.8 12.0 - 15.9 g/dL    Hematocrit 39.3 34.0 - 46.6 %    MCV 91.2 79.0 - 97.0 fL    MCH 29.7 26.6 - 33.0 pg    MCHC 32.6 31.5 - 35.7 g/dL    RDW 12.8 12.3 - 15.4 %    RDW-SD 43.4 37.0 - 54.0 fl    MPV 9.2 6.0 - 12.0 fL    Platelets 225 140 - 450 10*3/mm3    Neutrophil % 84.9 (H) 42.7 - 76.0 %    Lymphocyte % 9.7 (L) 19.6 - 45.3 %    Monocyte % 4.7 (L) 5.0 - 12.0 %    Eosinophil % 0.0 (L) 0.3 - 6.2 %    Basophil % 0.6 0.0 - 1.5 %    Immature Grans % 0.1 0.0 - 0.5 %    Neutrophils, Absolute 6.16 1.70 - 7.00 10*3/mm3    Lymphocytes, Absolute 0.70 0.70 - 3.10 10*3/mm3    Monocytes, Absolute 0.34 0.10 - 0.90 10*3/mm3    Eosinophils, Absolute 0.00 0.00 - 0.40 10*3/mm3    Basophils, Absolute 0.04 0.00 - 0.20 10*3/mm3    Immature Grans, Absolute 0.01 0.00 - 0.05 10*3/mm3   High Sensitivity Troponin T 1Hr    Collection Time: 12/31/24  4:08 PM    Specimen: Blood   Result Value Ref Range    HS Troponin T <6 <14 ng/L    Troponin T Numeric Delta         Ordered the above labs and independently interpreted results.  My findings will be discussed in the ED course or medical decision making section below      PROCEDURES:  ECG 12 Lead      Date/Time: 12/31/2024 3:06 PM    Performed by: Ramon Teixeira MD  Authorized by: Ramon Teixeira MD  Interpreted by ED physician  Comments: Ventricular paced rhythm rate of 64.          RADIOLOGY  RESULTS  XR Chest 1 View    Result Date: 12/31/2024  XR CHEST 1 VW-12/31/2024  HISTORY: Cough.  The heart size is mildly enlarged. Lungs are underinflated but appear clear. Cardiac pacemaker is seen in good position. Bones and soft tissues are unremarkable.      1. Mild cardiomegaly. 2. Lungs appear clear.     This report was finalized on 12/31/2024 3:01 PM by Dr. Ramon Garrett M.D on Workstation: [employer]        Ordered the above noted radiological studies.  Independently interpreted by me.  My findings will be discussed in the medical decision section below.     PROGRESS, DATA ANALYSIS, CONSULTS AND MEDICAL DECISION MAKING    Please note that this section constitutes my independent interpretation of clinical data including lab results, radiology, EKG's.  This constitutes my independent professional opinion regarding differential diagnosis and management of this patient.  It may include any factors such as history from outside sources, review of external records, social determinants of health, management of medications, response to those treatments, and discussions with other providers.    ED Course as of 12/31/24 1654 Tue Dec 31, 2024   1653 Patient is improving.  There are increased breath sounds bilaterally with less expiratory wheezes.  O2 sat between 91 and 95%.    Plan: Will change coverage to azithromycin, increase prednisone dosage, provide some increased cough relief. [TC]      ED Course User Index  [TC] Ramon Teixeira MD     Orders placed during this visit:  Orders Placed This Encounter   Procedures    COVID-19 and FLU A/B PCR, 1 HR TAT - Swab, Nasopharynx    RSV PCR - Swab, Nasopharynx    XR Chest 1 View    Comprehensive Metabolic Panel    Magnesium    BNP    High Sensitivity Troponin T    Lactic Acid, Plasma    CBC Auto Differential    High Sensitivity Troponin T 1Hr    ECG 12 Lead Dyspnea    ECG 12 Lead    CBC & Differential    ED Acknowledgement Form Needed;       Medications    ipratropium-albuterol (DUO-NEB) nebulizer solution 3 mL (3 mL Nebulization Given 12/31/24 1534)   methylPREDNISolone sodium succinate (SOLU-Medrol) injection 125 mg (125 mg Intravenous Given 12/31/24 1534)   sodium chloride 0.9 % bolus 1,000 mL (0 mL Intravenous Stopped 12/31/24 1650)   azithromycin (ZITHROMAX) tablet 500 mg (500 mg Oral Given 12/31/24 1605)            Medical Decision Making          DIAGNOSIS  Final diagnoses:   RSV (respiratory syncytial virus infection)   Bronchospasm          Medication List        New Prescriptions      azithromycin 250 MG tablet  Commonly known as: ZITHROMAX  2 po on day one, then 1 po daily x 4 days.     Hydrocod Juan Carlos-Chlorphe Juan Carlos ER 10-8 MG/5ML ER suspension  Commonly known as: TUSSIONEX PENNKINETIC  Take 5 mL by mouth Every 12 (Twelve) Hours As Needed for Cough.     predniSONE 20 MG tablet  Commonly known as: DELTASONE  3 po daily x 2d, then 2 po daily x 2d, then 1 po daily x 2d.               Where to Get Your Medications        These medications were sent to [facility] [id number] - [place], [place] - [address] AT [facility] - [phone]  - [phone]   [address], [facility] [address]      Phone: [phone]   azithromycin 250 MG tablet  Hydrocod Juan Carlos-Chlorphe Juan Carlos ER 10-8 MG/5ML ER suspension  predniSONE 20 MG tablet         FOLLOW-UP  Yon Styles MD  [address]  [address]  [place] [id number] [phone]    In 1 week          Latest Documented Vital Signs:  As of 16:54 EST  BP- 137/64 HR- 60 Temp- 98.2 °F (36.8 °C) (Oral) O2 sat- 93%    Appropriate PPE utilized throughout this patient encounter to include mask, if indicated, per current protocol. Hand hygiene was performed before donning PPE and after removal when leaving the room.    Please note that portions of this were completed with a voice recognition program.     Note Disclaimer: At [facility], we believe that sharing information builds trust and better  relationships. You are receiving this note because you are receiving care at [facility] or recently visited. It is possible you will see health information before a provider has talked with you about it. This kind of information can be easy to misunderstand. To help you fully understand what it means for your health, we urge you to discuss this note with your provider.

## 2024-12-31 NOTE — DISCHARGE INSTRUCTIONS
Today there is no evidence of pneumonia on your chest x-ray.  You certainly do have bronchitis and you are positive for RSV    We did give you an extra dose of IV steroids here today.  I am actually going to increase your dose of steroids with a higher taper.  You may hold your previous bottle of prednisone as I sent in a separate taper.  I am also changing the antibiotic over to azithromycin which has different coverage from the Amoxil.  You did receive your first dose of that here today will not be due another dose until tomorrow    Lastly I did send some very strong cough medicine into your pharmacy.  Also would like you to utilize your inhaler as follows: 2 puffs every 4 hours as needed for wheezing    Return for increased shortness of breath other difficulties    Please read all of the instructions in this handout.  If you receive prescriptions please fill them and take them as directed.  Please call your primary care physician for follow-up appointment in the next 5 to 7 days.  If you do not have a physician you may call the [facility] referral line at [phone].    You may return to the emergency department at any time for any concerns such as worsening symptoms.  If you received a work or school note it will be printed at the back of this packet.

## 2025-01-01 LAB
QT INTERVAL: 398 MS
QTC INTERVAL: 411 MS

## 2025-04-09 NOTE — PROGRESS NOTES
RM:________     PCP: Yon Styles MD    : 1935  AGE: 89 y.o.  EST PATIENT                     Wt Readings from Last 3 Encounters:   24 61.2 kg (135 lb)   24 61.2 kg (135 lb)   24 63.7 kg (140 lb 6.4 oz)           CP______  SOA_______ DIZZINESS _____ FATIGUE ______  PALPS ______    WT: ____________ BP: __________L __________R HR______    ALLERGIES:Ace inhibitors      Social History     Tobacco Use    Smoking status: Former     Current packs/day: 0.50     Average packs/day: 0.5 packs/day for 25.0 years (12.5 ttl pk-yrs)     Types: Cigarettes    Smokeless tobacco: Never    Tobacco comments:     Social smoker only   Vaping Use    Vaping status: Never Used   Substance Use Topics    Alcohol use: Yes     Comment: 2 or 3 glasses of wine weekly    Drug use: Never

## 2025-04-14 ENCOUNTER — OFFICE VISIT (OUTPATIENT)
Dept: CARDIOLOGY | Age: 89
End: 2025-04-14
Payer: MEDICARE

## 2025-04-14 VITALS
HEIGHT: 59 IN | WEIGHT: 137 LBS | DIASTOLIC BLOOD PRESSURE: 64 MMHG | SYSTOLIC BLOOD PRESSURE: 110 MMHG | BODY MASS INDEX: 27.62 KG/M2 | HEART RATE: 97 BPM

## 2025-04-14 DIAGNOSIS — I27.20 PULMONARY HYPERTENSION: ICD-10-CM

## 2025-04-14 DIAGNOSIS — I36.1 NONRHEUMATIC TRICUSPID VALVE REGURGITATION: ICD-10-CM

## 2025-04-14 DIAGNOSIS — Z95.0 S/P PLACEMENT OF CARDIAC PACEMAKER: ICD-10-CM

## 2025-04-14 DIAGNOSIS — E78.2 MIXED HYPERLIPIDEMIA: ICD-10-CM

## 2025-04-14 DIAGNOSIS — I25.118 CORONARY ARTERY DISEASE OF NATIVE ARTERY OF NATIVE HEART WITH STABLE ANGINA PECTORIS: Primary | ICD-10-CM

## 2025-04-14 DIAGNOSIS — I48.0 PAROXYSMAL ATRIAL FIBRILLATION: ICD-10-CM

## 2025-04-14 DIAGNOSIS — I49.5 TACHYCARDIA-BRADYCARDIA SYNDROME: ICD-10-CM

## 2025-04-14 DIAGNOSIS — I10 PRIMARY HYPERTENSION: ICD-10-CM

## 2025-04-14 PROBLEM — I25.10 NONOCCLUSIVE CORONARY ATHEROSCLEROSIS OF NATIVE CORONARY ARTERY: Status: RESOLVED | Noted: 2023-07-27 | Resolved: 2025-04-14

## 2025-04-14 PROCEDURE — 93000 ELECTROCARDIOGRAM COMPLETE: CPT | Performed by: INTERNAL MEDICINE

## 2025-04-14 PROCEDURE — 99214 OFFICE O/P EST MOD 30 MIN: CPT | Performed by: INTERNAL MEDICINE

## 2025-04-14 PROCEDURE — 1159F MED LIST DOCD IN RCRD: CPT | Performed by: INTERNAL MEDICINE

## 2025-04-14 PROCEDURE — 1160F RVW MEDS BY RX/DR IN RCRD: CPT | Performed by: INTERNAL MEDICINE

## 2025-04-14 RX ORDER — SODIUM BICARBONATE 650 MG/1
650 TABLET ORAL TAKE AS DIRECTED
COMMUNITY

## 2025-04-14 RX ORDER — OMEPRAZOLE 40 MG/1
40 CAPSULE, DELAYED RELEASE ORAL DAILY
COMMUNITY
Start: 2025-04-07 | End: 2025-07-06

## 2025-04-14 NOTE — LETTER
2025     Yon Styles MD  [address]  [address]  [facility] [address]    Patient: Brina Navarrete   YOB: 1935   Date of Visit: 2025     Dear Yon Styles MD:       Thank you for referring Brina Navarrete to me for evaluation. Below are the relevant portions of my assessment and plan of care.    If you have questions, please do not hesitate to call me. I look forward to following Brina along with you.         Sincerely,        Anderson Cordova MD        CC: No Recipients    Anderson Cordova MD  25 1612  Sign when Signing Visit  Date of Office Visit: 25  Encounter Provider: Anderson Cordova MD  Place of Service: [facility] CARDIOLOGY  Patient Name: Brina Navarrete  :1935    Chief Complaint   Patient presents with   • Coronary Artery Disease   :     HPI:     Ms. Navarrete is 89 y.o. and presents today in follow up. I have reviewed prior notes and there are no changes except for any new updates described below. I have also reviewed any information entered into the medical record by the patient or by ancillary staff.     She was diagnosed with atrial flutter in  and underwent successful ablation. In 2015, she was diagnosed with PAF and tachy-garry syndrome; she underwent implantation of a dual chamber MDT pacemaker.    In , she underwent coronary angiography, which revealed a 60% lesion in the mid-distal LAD, for which medical therapy was recommended.    She presented in 2024 with intermittent jaw and chest discomfort. A perfusion stress test was normal. An echo revealed normal LVSF, severe TR, and mild-moderate PHTN. She ultimately underwent coronary angiography; this revealed mild disease of the major epicardial vessels, but there was a 90% lesion in a small diagonal. Medical therapy was recommended.    She remains on metoprolol and ISMN. She's doing well without anginal symptoms. She denies palps, SOA, or chest pain. She denies syncope  or edema.     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 2015   • Varicose veins of both lower extremities        Past Surgical History:   Procedure Laterality Date   • ABLATION OF DYSRHYTHMIC FOCUS  8/24/2015    Procedure to correct abnormal heart rhythm   • CARDIAC CATHETERIZATION  05/21/14   • CARDIAC CATHETERIZATION N/A 11/7/2024    Procedure: Left Heart Cath;  Surgeon: Danny Rose MD;  Location:  [facility] CATH INVASIVE LOCATION;  Service: Cardiovascular;  Laterality: N/A;   • CARDIAC CATHETERIZATION N/A 11/7/2024    Procedure: Left ventriculography;  Surgeon: Danny Rose MD;  Location:  [facility] CATH INVASIVE LOCATION;  Service: Cardiovascular;  Laterality: N/A;   • CARDIAC CATHETERIZATION N/A 11/7/2024    Procedure: Coronary angiography;  Surgeon: Danny Rose MD;  Location:  [facility] CATH INVASIVE LOCATION;  Service: Cardiovascular;  Laterality: N/A;   • INSERT / REPLACE / REMOVE PACEMAKER  4/20/2015    Insertion of permanent pacemaker upper and lower chamber   • PACEMAKER IMPLANTATION      [employer]       Social History     Socioeconomic History   • Marital status:    • Number of children: 2   Tobacco Use   • Smoking status: Former     Current packs/day: 0.50     Average packs/day: 0.5 packs/day for 25.0 years (12.5 ttl pk-yrs)     Types: Cigarettes     Passive exposure: Never   • Smokeless tobacco: Never   • Tobacco comments:     Social smoker only   Vaping Use   • Vaping status: Never Used   Substance and Sexual Activity   • Alcohol use: Yes      Comment: 2 or 3 glasses of wine weekly   • Drug use: Never   • Sexual activity: Defer     Partners: Male     Birth control/protection: Pill, Diaphragm     Comment: Marriage only       Family History   Problem Relation Age of Onset   • Arrhythmia Father         Age  78, a heart attack , recovered   • Heart attack Father    • Arrhythmia Brother         Ar rhythmic in 50’s,  stroke at  73       Review of Systems   Cardiovascular:         Leg pain     All other systems reviewed and are negative.      Allergies   Allergen Reactions   • Ace Inhibitors Cough and Other (See Comments)     Causes cough         Current Outpatient Medications:   •  acetaminophen (TYLENOL) 500 MG tablet, Take 1 tablet by mouth Every 6 (Six) Hours As Needed for Mild Pain., Disp: , Rfl:   •  atorvastatin (LIPITOR) 20 MG tablet, Daily., Disp: , Rfl:   •  Eliquis 5 MG tablet tablet, Every 12 (Twelve) Hours., Disp: , Rfl:   •  isosorbide mononitrate (IMDUR) 60 MG 24 hr tablet, Take 1 tablet by mouth Daily., Disp: 90 tablet, Rfl: 3  •  Loratadine (CLARITIN PO), Take 1 tablet by mouth Daily., Disp: , Rfl:   •  metoprolol succinate XL (TOPROL-XL) 50 MG 24 hr tablet, Take 1 tablet by mouth 2 (Two) Times a Day., Disp: , Rfl:   •  multivitamin with minerals (Centrum Silver 50+Women) tablet tablet, Take 1 tablet by mouth Daily., Disp: , Rfl:   •  nitroglycerin (NITROSTAT) 0.4 MG SL tablet, 1 under the tongue as needed for angina, may repeat q5mins for up three doses, Disp: 30 tablet, Rfl: 1  •  omeprazole (priLOSEC) 40 MG capsule, Take 1 capsule by mouth Daily., Disp: , Rfl:   •  sertraline (ZOLOFT) 25 MG tablet, Take 1 tablet by mouth Daily., Disp: , Rfl:   •  sodium bicarbonate 650 MG tablet, Take 1 tablet by mouth Take As Directed., Disp: , Rfl:   •  vitamin D3 125 MCG (5000 UT) capsule capsule, Take 1 capsule by mouth Daily. OTC, Disp: , Rfl:       Objective:     Vitals:    04/14/25 1340   BP: 110/64   BP Location: Left arm   Pulse: 97   Weight: 62.1 kg  "(137 lb)   Height: 149.9 cm (59\")       Body mass index is 27.67 kg/m².    Vitals reviewed.   Constitutional:       Appearance: Well-developed and not in distress.   Eyes:      Conjunctiva/sclera: Conjunctivae normal.   HENT:      Head: Normocephalic.      Nose: Nose normal.   Neck:      Thyroid: Thyroid normal.      Vascular: No JVD. JVD normal.      Lymphadenopathy: No cervical adenopathy.   Pulmonary:      Effort: Pulmonary effort is normal.      Breath sounds: Normal breath sounds.   Cardiovascular:      Normal rate. Irregularly irregular rhythm.      Murmurs: There is no murmur.   Pulses:     Intact distal pulses.   Edema:     Peripheral edema absent.   Abdominal:      Palpations: Abdomen is soft.      Tenderness: There is no abdominal tenderness.   Musculoskeletal: Normal range of motion.      Cervical back: Normal range of motion. Skin:     General: Skin is warm and dry.   Neurological:      General: No focal deficit present.      Mental Status: Alert and oriented to person, place, and time.      Cranial Nerves: No cranial nerve deficit.   Psychiatric:         Behavior: Behavior normal.         Thought Content: Thought content normal.         Judgment: Judgment normal.           ECG 12 Lead    Date/Time: 4/14/2025 2:04 PM  Performed by: Anderson Cordova MD    Authorized by: Anderson Cordova MD  Comparison: compared with previous ECG   Similar to previous ECG  Rhythm: atrial fibrillation  Conduction: conduction normal  ST Segments: ST segments normal  T Waves: T waves normal  QRS axis: left  Other findings: low voltage    Clinical impression: abnormal EKG          Assessment:       Diagnosis Plan   1. Coronary artery disease of native artery of native heart with stable angina pectoris  ECG 12 Lead      2. Mixed hyperlipidemia        3. Primary hypertension        4. Nonrheumatic tricuspid valve regurgitation        5. Pulmonary hypertension        6. Paroxysmal atrial fibrillation        7. Tachycardia-bradycardia " syndrome        8. S/P placement of cardiac pacemaker          Plan:       1/2.  She had coronary disease of a small diagonal in November 2024. Medical therapy is recommended. She's not on aspirin as she's anticoagulated. She's on metoprolol and ISMN, as well as atorvastatin.     3. Her BP is well controlled.    4/5. In October 2024, an echo reported severe TR and mild-moderate PHTN. She does not have a loud murmur, or JVD, or a garland V wave. She's euvolemic. We'll repeat this after a year or so.    6/7/8. She's in rate controlled AF. Her device is working well.  She is going to continue to follow with her electrophysiologist in [facility] until the pacemaker battery is exchanged.  She would then like to transfer her pacemaker care to us.  That is completely fine with me.    Sincerely,       Anderson Cordova MD

## 2025-04-14 NOTE — PROGRESS NOTES
Date of Office Visit: 25  Encounter Provider: Anderson Cordova MD  Place of Service: [facility] CARDIOLOGY  Patient Name: Brina Navarrete  :1935    Chief Complaint   Patient presents with    Coronary Artery Disease   :     HPI:     Ms. Navarrete is 89 y.o. and presents today in follow up. I have reviewed prior notes and there are no changes except for any new updates described below. I have also reviewed any information entered into the medical record by the patient or by ancillary staff.     She was diagnosed with atrial flutter in  and underwent successful ablation. In 2015, she was diagnosed with PAF and tachy-garry syndrome; she underwent implantation of a dual chamber MDT pacemaker.    In , she underwent coronary angiography, which revealed a 60% lesion in the mid-distal LAD, for which medical therapy was recommended.    She presented in 2024 with intermittent jaw and chest discomfort. A perfusion stress test was normal. An echo revealed normal LVSF, severe TR, and mild-moderate PHTN. She ultimately underwent coronary angiography; this revealed mild disease of the major epicardial vessels, but there was a 90% lesion in a small diagonal. Medical therapy was recommended.    She remains on metoprolol and ISMN. She's doing well without anginal symptoms. She denies palps, SOA, or chest pain. She denies syncope or edema.     Past Medical History:   Diagnosis Date    Atrial flutter     s/p ablation     Coronary artery disease of native artery of native heart with stable angina pectoris 10/28/2024    Hyperlipidemia     Hypertension 2015    Hyponatremia     Nonrheumatic tricuspid valve regurgitation 2024    Severe per echo 2024      Paroxysmal atrial fibrillation     Pulmonary hypertension 2024    Moderate per echo 2024      Tachycardia-bradycardia syndrome     s/p MDT dual chamber PPM     Varicose veins of both lower extremities         Past Surgical History:   Procedure Laterality Date    ABLATION OF DYSRHYTHMIC FOCUS  8/24/2015    Procedure to correct abnormal heart rhythm    CARDIAC CATHETERIZATION  05/21/14    CARDIAC CATHETERIZATION N/A 11/7/2024    Procedure: Left Heart Cath;  Surgeon: Danny Rose MD;  Location:  [facility];  Service: Cardiovascular;  Laterality: N/A;    CARDIAC CATHETERIZATION N/A 11/7/2024    Procedure: Left ventriculography;  Surgeon: Danny Rose MD;  Location:  [facility];  Service: Cardiovascular;  Laterality: N/A;    CARDIAC CATHETERIZATION N/A 11/7/2024    Procedure: Coronary angiography;  Surgeon: Danny Rose MD;  Location:  [facility];  Service: Cardiovascular;  Laterality: N/A;    INSERT / REPLACE / REMOVE PACEMAKER  4/20/2015    Insertion of permanent pacemaker upper and lower chamber    PACEMAKER IMPLANTATION      [employer]       Social History     Socioeconomic History    Marital status:     Number of children: 2   Tobacco Use    Smoking status: Former     Current packs/day: 0.50     Average packs/day: 0.5 packs/day for 25.0 years (12.5 ttl pk-yrs)     Types: Cigarettes     Passive exposure: Never    Smokeless tobacco: Never    Tobacco comments:     Social smoker only   Vaping Use    Vaping status: Never Used   Substance and Sexual Activity    Alcohol use: Yes     Comment: 2 or 3 glasses of wine weekly    Drug use: Never    Sexual activity: Defer     Partners: Male     Birth control/protection: Pill, Diaphragm     Comment: Marriage only       Family History   Problem Relation Age of Onset    Arrhythmia Father         Age  78, a heart attack , recovered    Heart attack Father     Arrhythmia Brother         Ar rhythmic in 50’s,  stroke at  73       Review of Systems   Cardiovascular:         Leg pain     All other systems reviewed and are negative.      Allergies   Allergen Reactions    Ace Inhibitors Cough and Other (See Comments)  "    Causes cough         Current Outpatient Medications:     acetaminophen (TYLENOL) 500 MG tablet, Take 1 tablet by mouth Every 6 (Six) Hours As Needed for Mild Pain., Disp: , Rfl:     atorvastatin (LIPITOR) 20 MG tablet, Daily., Disp: , Rfl:     Eliquis 5 MG tablet tablet, Every 12 (Twelve) Hours., Disp: , Rfl:     isosorbide mononitrate (IMDUR) 60 MG 24 hr tablet, Take 1 tablet by mouth Daily., Disp: 90 tablet, Rfl: 3    Loratadine (CLARITIN PO), Take 1 tablet by mouth Daily., Disp: , Rfl:     metoprolol succinate XL (TOPROL-XL) 50 MG 24 hr tablet, Take 1 tablet by mouth 2 (Two) Times a Day., Disp: , Rfl:     multivitamin with minerals (Centrum Silver 50+Women) tablet tablet, Take 1 tablet by mouth Daily., Disp: , Rfl:     nitroglycerin (NITROSTAT) 0.4 MG SL tablet, 1 under the tongue as needed for angina, may repeat q5mins for up three doses, Disp: 30 tablet, Rfl: 1    omeprazole (priLOSEC) 40 MG capsule, Take 1 capsule by mouth Daily., Disp: , Rfl:     sertraline (ZOLOFT) 25 MG tablet, Take 1 tablet by mouth Daily., Disp: , Rfl:     sodium bicarbonate 650 MG tablet, Take 1 tablet by mouth Take As Directed., Disp: , Rfl:     vitamin D3 125 MCG (5000 UT) capsule capsule, Take 1 capsule by mouth Daily. OTC, Disp: , Rfl:       Objective:     Vitals:    04/14/25 1340   BP: 110/64   BP Location: Left arm   Pulse: 97   Weight: 62.1 kg (137 lb)   Height: 149.9 cm (59\")       Body mass index is 27.67 kg/m².    Vitals reviewed.   Constitutional:       Appearance: Well-developed and not in distress.   Eyes:      Conjunctiva/sclera: Conjunctivae normal.   HENT:      Head: Normocephalic.      Nose: Nose normal.   Neck:      Thyroid: Thyroid normal.      Vascular: No JVD. JVD normal.      Lymphadenopathy: No cervical adenopathy.   Pulmonary:      Effort: Pulmonary effort is normal.      Breath sounds: Normal breath sounds.   Cardiovascular:      Normal rate. Irregularly irregular rhythm.      Murmurs: There is no murmur. "   Pulses:     Intact distal pulses.   Edema:     Peripheral edema absent.   Abdominal:      Palpations: Abdomen is soft.      Tenderness: There is no abdominal tenderness.   Musculoskeletal: Normal range of motion.      Cervical back: Normal range of motion. Skin:     General: Skin is warm and dry.   Neurological:      General: No focal deficit present.      Mental Status: Alert and oriented to person, place, and time.      Cranial Nerves: No cranial nerve deficit.   Psychiatric:         Behavior: Behavior normal.         Thought Content: Thought content normal.         Judgment: Judgment normal.           ECG 12 Lead    Date/Time: 4/14/2025 2:04 PM  Performed by: Anderson Cordova MD    Authorized by: Anderson Cordova MD  Comparison: compared with previous ECG   Similar to previous ECG  Rhythm: atrial fibrillation  Conduction: conduction normal  ST Segments: ST segments normal  T Waves: T waves normal  QRS axis: left  Other findings: low voltage    Clinical impression: abnormal EKG          Assessment:       Diagnosis Plan   1. Coronary artery disease of native artery of native heart with stable angina pectoris  ECG 12 Lead      2. Mixed hyperlipidemia        3. Primary hypertension        4. Nonrheumatic tricuspid valve regurgitation        5. Pulmonary hypertension        6. Paroxysmal atrial fibrillation        7. Tachycardia-bradycardia syndrome        8. S/P placement of cardiac pacemaker          Plan:       1/2.  She had coronary disease of a small diagonal in November 2024. Medical therapy is recommended. She's not on aspirin as she's anticoagulated. She's on metoprolol and ISMN, as well as atorvastatin.     3. Her BP is well controlled.    4/5. In October 2024, an echo reported severe TR and mild-moderate PHTN. She does not have a loud murmur, or JVD, or a garland V wave. She's euvolemic. We'll repeat this after a year or so.    6/7/8. She's in rate controlled AF. Her device is working well.  She is going to  continue to follow with her electrophysiologist in [facility] until the pacemaker battery is exchanged.  She would then like to transfer her pacemaker care to us.  That is completely fine with me.    Sincerely,       Anderson Cordova MD

## 2025-05-22 RX ORDER — NITROGLYCERIN 0.4 MG/1
TABLET SUBLINGUAL
Qty: 30 TABLET | Refills: 1 | Status: SHIPPED | OUTPATIENT
Start: 2025-05-22

## 2025-08-21 ENCOUNTER — HOSPITAL ENCOUNTER (OUTPATIENT)
Facility: HOSPITAL | Age: 89
Discharge: HOME | End: 2025-08-21
Attending: EMERGENCY MEDICINE | Admitting: EMERGENCY MEDICINE
Payer: MEDICARE

## (undated) DEVICE — DGW .035 FC J3MM 260CM TEF: Brand: EMERALD

## (undated) DEVICE — GLIDESHEATH BASIC HYDROPHILIC COATED INTRODUCER SHEATH: Brand: GLIDESHEATH

## (undated) DEVICE — LOU PACE DEFIB: Brand: MEDLINE INDUSTRIES, INC.

## (undated) DEVICE — KT MANIFLD CARDIAC

## (undated) DEVICE — CATH DIAG IMPULSE FR4 5F 100CM

## (undated) DEVICE — PK CATH CARD 40

## (undated) DEVICE — CATH VENT MIV RADL PIG ST TIP 4F 110CM

## (undated) DEVICE — CATH DIAG IMPULSE FL3.5 5F 100CM